# Patient Record
Sex: MALE | Race: WHITE | Employment: FULL TIME | URBAN - METROPOLITAN AREA
[De-identification: names, ages, dates, MRNs, and addresses within clinical notes are randomized per-mention and may not be internally consistent; named-entity substitution may affect disease eponyms.]

---

## 2017-01-11 RX ORDER — METFORMIN HYDROCHLORIDE 500 MG/1
TABLET ORAL
Qty: 120 TAB | Refills: 4 | OUTPATIENT
Start: 2017-01-11

## 2017-01-12 RX ORDER — METFORMIN HYDROCHLORIDE 500 MG/1
1000 TABLET ORAL 2 TIMES DAILY WITH MEALS
Qty: 120 TAB | Refills: 4 | Status: SHIPPED | OUTPATIENT
Start: 2017-01-12 | End: 2017-07-30 | Stop reason: SDUPTHER

## 2017-05-23 ENCOUNTER — APPOINTMENT (OUTPATIENT)
Dept: GENERAL RADIOLOGY | Age: 57
End: 2017-05-23
Attending: FAMILY MEDICINE

## 2017-05-23 ENCOUNTER — HOSPITAL ENCOUNTER (EMERGENCY)
Age: 57
Discharge: HOME | End: 2017-05-23
Attending: FAMILY MEDICINE

## 2017-05-23 VITALS
BODY MASS INDEX: 33.86 KG/M2 | TEMPERATURE: 97.6 F | HEART RATE: 80 BPM | SYSTOLIC BLOOD PRESSURE: 159 MMHG | HEIGHT: 72 IN | WEIGHT: 250 LBS | DIASTOLIC BLOOD PRESSURE: 79 MMHG | RESPIRATION RATE: 16 BRPM | OXYGEN SATURATION: 99 %

## 2017-05-23 DIAGNOSIS — M19.072 ARTHRITIS OF LEFT FOOT: Primary | ICD-10-CM

## 2017-05-23 RX ORDER — TRAMADOL HYDROCHLORIDE 50 MG/1
50 TABLET ORAL
Qty: 20 TAB | Refills: 0 | Status: SHIPPED | OUTPATIENT
Start: 2017-05-23 | End: 2019-02-05

## 2017-05-23 NOTE — UC PROVIDER NOTE
Patient is a 61 y.o. male presenting with foot pain. The history is provided by the patient. No  was used. Foot Pain    This is a new problem. Episode onset: 1 month. Episode frequency: Intermittent. The problem has not changed since onset. The pain is present in the left foot (Left lateral foot). The quality of the pain is described as aching. The pain is at a severity of 2/10. The pain is mild. Pertinent negatives include no numbness, full range of motion and no tingling. The symptoms are aggravated by movement and palpation. He has tried OTC pain medications for the symptoms. The treatment provided no relief. There has been no history of extremity trauma. Denies hx of Gout        Past Medical History:   Diagnosis Date    Diabetes ([address]     Dyspepsia and other specified disorders of function of stomach     GERD (gastroesophageal reflux disease)     Hemorrhoids     Psoriasis     Psoriasis     Umbilical hernia     Umbilical hernia         Past Surgical History:   Procedure Laterality Date    HX APPENDECTOMY      HX HERNIA REPAIR  6-9-15    repair [facility] umbilical [facility]nia with underlay mesh-[facility]Dr. Quita Carlos    HX TONSILLECTOMY           Family History   Problem Relation Age of Onset    Heart Attack Father       63's    Diabetes Father         Social History     Social History    Marital status:      Spouse name: N/A    Number of children: N/A    Years of education: N/A     Occupational History    Not on file.      Social History Main Topics    Smoking status: Former Smoker     Packs/day: 2.00     Years: 21.00     Types: Cigarettes     Quit date: 1998    Smokeless tobacco: Never Used    Alcohol use 1.0 oz/week     2 Cans of beer per week      Comment: sometimes    Drug use: No    Sexual activity: Not on file     Other Topics Concern    Not on file     Social History Narrative                ALLERGIES: Aspirin and Motrin [ibuprofen]    Review of Systems Constitutional: Negative. HENT: Negative. Eyes: Negative. Respiratory: Negative. Cardiovascular: Negative. Gastrointestinal: Negative. Endocrine: Negative. Genitourinary: Negative. Musculoskeletal:        Left lateral foot pain. Allergic/Immunologic: Negative. Neurological: Negative. Negative for tingling and numbness. Hematological: Negative. Vitals:    05/23/17 1709   BP: 159/79   Pulse: 80   Resp: 16   Temp: 97.6 °F (36.4 °C)   SpO2: 99%   Weight: 113.4 kg (250 lb)   Height: 6' (1.829 m)       Physical Exam   Constitutional: He is oriented to person, place, and time. He appears well-developed and well-nourished. HENT:   Head: Normocephalic and atraumatic. Right Ear: External ear normal.   Left Ear: External ear normal.   Nose: Nose normal.   Mouth/Throat: Oropharynx is clear and moist.   Eyes: Conjunctivae and EOM are normal. Pupils are equal, round, and reactive to light. Neck: Normal range of motion. Cardiovascular: Normal rate, regular rhythm, normal heart sounds and intact distal pulses. Pulmonary/Chest: Effort normal and breath sounds normal.   Musculoskeletal: Normal range of motion. He exhibits tenderness. He exhibits no deformity. Left lateral foot tenderness with palpation  2+ DP/PT pulses  No erythema or deformity  Nails intact   Neurological: He is alert and oriented to person, place, and time. Skin: Skin is warm and dry. Psychiatric: He has a normal mood and affect. His behavior is normal. Thought content normal.   Nursing note and vitals reviewed. MDM     Differential Diagnosis; Clinical Impression; Plan:     CLINICAL IMPRESSION:  Arthritis of left foot  (primary encounter diagnosis)    Plan:  1. Arthritis   2. Wear good fitting shoes  3.  Follow up with podiatrist  Amount and/or Complexity of Data Reviewed:   Tests in the radiology section of CPT®:  Ordered and reviewed  Risk of Significant Complications, Morbidity, and/or Mortality: Presenting problems: Moderate  Diagnostic procedures:   Moderate  Progress:   Patient progress:  Stable      Procedures

## 2017-05-23 NOTE — DISCHARGE INSTRUCTIONS
Osteoarthritis: Care Instructions  Your Care Instructions    Arthritis is a common health problem in which the joints are inflamed. There are several kinds of arthritis. Osteoarthritis is caused by a breakdown of cartilage, the hard, thick tissue that cushions the joints. It causes pain, stiffness, and swelling, often in the spine, fingers, hips, and knees. Osteoarthritis can happen at any age, but it is most common in older people. Osteoarthritis never goes away completely, but it can be controlled. Medicine and home treatment can reduce the pain and prevent the arthritis from getting worse. Follow-up care is a key part of your treatment and safety. Be sure to make and go to all appointments, and call your doctor if you are having problems. It's also a good idea to know your test results and keep a list of the medicines you take. How can you care for yourself at home? · Take a warm shower or bath in the morning to relieve stiffness. Avoid sitting still afterwards. · If the joint is not swollen, use moist heat, like a warm, damp towel, for 20 to 30 minutes, 2 or 3 times a day. Do not use heat on a swollen joint. · If the joint is swollen, use ice or cold packs for 10 to 20 minutes, once an hour. Cold will help relieve pain and reduce inflammation. Put a thin cloth between the ice and your skin. · To prevent stiffness, gently move the joint through its full range of motion several times a day. · If the joint hurts, avoid activities that put a strain on it for a few days. Take rest breaks throughout the day. · Get regular exercise. Walking, swimming, yoga, biking, re chi, and water aerobics are good exercises that are gentle on the joints. · Reach and stay at a healthy weight. If you need to lose or maintain weight, regular exercise and a healthy diet will help. Extra weight can strain the joints, especially the knees and hips, and make the pain worse. Losing even a few pounds may help.   · Take pain medicines exactly as directed. ¨ If the doctor gave you a prescription medicine for pain, take it as prescribed. ¨ If you are not taking a prescription pain medicine, ask your doctor if you can take an over-the-counter medicine. When should you call for help? Call your doctor now or seek immediate medical care if:  · The pain is so bad that you cannot use the joint. · You have sudden back pain with weakness in your legs or loss of bowel or bladder control. · Your stools are black and tarlike or have streaks of blood. · You have severe pain and swelling in more than one joint. Watch closely for changes in your health, and be sure to contact your doctor if:  · You have side effects from the medicines, like belly pain, ongoing heartburn, or nausea. · Joint pain continues for more than 6 weeks, and home treatment is not helping. Where can you learn more? Go to http://selwyn-gerardo.info/. Enter M825 in the search box to learn more about \"Osteoarthritis: Care Instructions. \"  Current as of: November 28, 2016  Content Version: 11.2  © 4285-9025 [address] Care instructions adapted under license by [employer] (which disclaims liability or warranty for this information). If you have questions about a medical condition or this instruction, always ask your healthcare professional. [address] any warranty or liability for your use of this information.

## 2017-07-30 RX ORDER — METFORMIN HYDROCHLORIDE 500 MG/1
TABLET ORAL
Qty: 120 TAB | Refills: 4 | Status: SHIPPED | OUTPATIENT
Start: 2017-07-30 | End: 2017-12-31 | Stop reason: SDUPTHER

## 2017-08-28 RX ORDER — ESCITALOPRAM OXALATE 20 MG/1
TABLET ORAL
Qty: 90 TAB | Refills: 1 | Status: SHIPPED | OUTPATIENT
Start: 2017-08-28 | End: 2019-03-11 | Stop reason: SDUPTHER

## 2017-12-31 RX ORDER — METFORMIN HYDROCHLORIDE 500 MG/1
TABLET ORAL
Qty: 120 TAB | Refills: 4 | Status: SHIPPED | OUTPATIENT
Start: 2017-12-31 | End: 2018-09-01 | Stop reason: SDUPTHER

## 2017-12-31 RX ORDER — PRAVASTATIN SODIUM 20 MG/1
TABLET ORAL
Qty: 90 TAB | Refills: 3 | Status: SHIPPED | OUTPATIENT
Start: 2017-12-31 | End: 2019-03-11 | Stop reason: SDUPTHER

## 2018-03-12 RX ORDER — BLOOD SUGAR DIAGNOSTIC
STRIP MISCELLANEOUS
Qty: 50 STRIP | Refills: 11 | Status: SHIPPED | OUTPATIENT
Start: 2018-03-12 | End: 2019-03-11 | Stop reason: SDUPTHER

## 2018-03-22 RX ORDER — SITAGLIPTIN 100 MG/1
TABLET, FILM COATED ORAL
Qty: 30 TAB | Refills: 6 | Status: SHIPPED | OUTPATIENT
Start: 2018-03-22 | End: 2018-11-13 | Stop reason: SDUPTHER

## 2018-09-01 RX ORDER — METFORMIN HYDROCHLORIDE 500 MG/1
TABLET ORAL
Qty: 120 TAB | Refills: 4 | Status: SHIPPED | OUTPATIENT
Start: 2018-09-01 | End: 2019-03-11 | Stop reason: SDUPTHER

## 2018-11-13 RX ORDER — SITAGLIPTIN 100 MG/1
TABLET, FILM COATED ORAL
Qty: 30 TAB | Refills: 6 | Status: SHIPPED | OUTPATIENT
Start: 2018-11-13 | End: 2019-03-11 | Stop reason: SDUPTHER

## 2019-02-05 ENCOUNTER — OFFICE VISIT (OUTPATIENT)
Dept: URGENT CARE | Age: 59
End: 2019-02-05

## 2019-02-05 VITALS
HEART RATE: 74 BPM | TEMPERATURE: 97.6 F | HEIGHT: 72 IN | DIASTOLIC BLOOD PRESSURE: 72 MMHG | SYSTOLIC BLOOD PRESSURE: 141 MMHG | BODY MASS INDEX: 33.46 KG/M2 | WEIGHT: 247 LBS | RESPIRATION RATE: 18 BRPM | OXYGEN SATURATION: 95 %

## 2019-02-05 DIAGNOSIS — J40 BRONCHITIS: Primary | ICD-10-CM

## 2019-02-05 DIAGNOSIS — R05.9 COUGH: ICD-10-CM

## 2019-02-05 RX ORDER — IPRATROPIUM BROMIDE AND ALBUTEROL SULFATE 2.5; .5 MG/3ML; MG/3ML
3 SOLUTION RESPIRATORY (INHALATION)
Status: COMPLETED | OUTPATIENT
Start: 2019-02-05 | End: 2019-02-05

## 2019-02-05 RX ORDER — PREDNISONE 10 MG/1
TABLET ORAL
Qty: 21 TAB | Refills: 0 | Status: SHIPPED | OUTPATIENT
Start: 2019-02-05 | End: 2019-03-11

## 2019-02-05 RX ORDER — ALBUTEROL SULFATE 90 UG/1
2 AEROSOL, METERED RESPIRATORY (INHALATION)
Qty: 1 INHALER | Refills: 0 | Status: SHIPPED | OUTPATIENT
Start: 2019-02-05 | End: 2021-09-03 | Stop reason: SDUPTHER

## 2019-02-05 RX ORDER — BUDESONIDE AND FORMOTEROL FUMARATE DIHYDRATE 80; 4.5 UG/1; UG/1
2 AEROSOL RESPIRATORY (INHALATION) 2 TIMES DAILY
COMMUNITY
End: 2019-03-11 | Stop reason: SDUPTHER

## 2019-02-05 RX ORDER — DOXYCYCLINE 100 MG/1
100 CAPSULE ORAL 2 TIMES DAILY
Qty: 20 CAP | Refills: 0 | Status: SHIPPED | OUTPATIENT
Start: 2019-02-05 | End: 2019-02-15

## 2019-02-05 RX ADMIN — IPRATROPIUM BROMIDE AND ALBUTEROL SULFATE 3 ML: 2.5; .5 SOLUTION RESPIRATORY (INHALATION) at 10:45

## 2019-02-05 NOTE — PROGRESS NOTES
Cold Symptoms   The history is provided by the patient. This is a new problem. Episode onset: 1 week ago. The problem occurs constantly. The problem has not changed since onset. The cough is productive of sputum. There has been no fever. Associated symptoms include rhinorrhea and shortness of breath. Pertinent negatives include no chest pain, no chills, no sweats, no ear congestion, no ear pain, no sore throat, no myalgias and no wheezing. Smoker: Former. His past medical history is significant for COPD. Past Medical History:   Diagnosis Date    Diabetes ([address]     Dyspepsia and other specified disorders of function of stomach     GERD (gastroesophageal reflux disease)     Hemorrhoids     Psoriasis     Psoriasis     Umbilical hernia     Umbilical hernia 1977        Past Surgical History:   Procedure Laterality Date    HX APPENDECTOMY      HX HERNIA REPAIR  6-9-15    repair of umbilical henia with underlay mesh-[facility]-Dr. Opal Sanchez    HX TONSILLECTOMY           Family History   Problem Relation Age of Onset    Heart Attack Father          63's    Diabetes Father         Social History     Socioeconomic History    Marital status:      Spouse name: Not on file    Number of children: Not on file    Years of education: Not on file    Highest education level: Not on file   Social Needs    Financial resource strain: Not on file    Food insecurity - worry: Not on file    Food insecurity - inability: Not on file   [employer] needs - medical: Not on file   [employer] needs - non-medical: Not on file   Occupational History    Not on file   Tobacco Use    Smoking status: Former Smoker     Packs/day: 2.00     Years: 21.00     Pack years: 42.00     Types: Cigarettes     Last attempt to quit: 1998     Years since quittin.1    Smokeless tobacco: Never Used   Substance and Sexual Activity    Alcohol use:  Yes     Alcohol/week: 1.0 oz     Types: 2 Cans of beer per week     Comment: sometimes    Drug use: No    Sexual activity: Not on file   Other Topics Concern    Not on file   Social History Narrative    Not on file                ALLERGIES: Aspirin and Motrin [ibuprofen]    Review of Systems   Constitutional: Negative for chills and fever. HENT: Positive for congestion and rhinorrhea. Negative for ear pain, sinus pressure, sinus pain and sore throat. Respiratory: Positive for cough and shortness of breath. Negative for wheezing. Cardiovascular: Negative for chest pain and palpitations. Musculoskeletal: Negative for myalgias. Skin: Negative for rash. Hematological: Negative for adenopathy. Vitals:    02/05/19 1026   BP: 141/72   Pulse: 74   Resp: 18   Temp: 97.6 °F (36.4 °C)   SpO2: 95%   Weight: 247 lb (112 kg)   Height: 6' (1.829 m)       Physical Exam   Constitutional: He appears well-developed and well-nourished. No distress. HENT:   Right Ear: Tympanic membrane, external ear and ear canal normal.   Left Ear: Tympanic membrane, external ear and ear canal normal.   Nose: Nose normal. Right sinus exhibits no maxillary sinus tenderness and no frontal sinus tenderness. Left sinus exhibits no maxillary sinus tenderness and no frontal sinus tenderness. Mouth/Throat: Oropharynx is clear and moist and mucous membranes are normal. No oropharyngeal exudate, posterior oropharyngeal edema, posterior oropharyngeal erythema or tonsillar abscesses. Cardiovascular: Normal rate, regular rhythm and normal heart sounds. Pulmonary/Chest: Effort normal. No respiratory distress. He has decreased breath sounds in the right lower field. He has no wheezes. He has no rhonchi. He has no rales. Lymphadenopathy:     He has no cervical adenopathy. Neurological: He is alert. Skin: He is not diaphoretic. Psychiatric: He has a normal mood and affect. His behavior is normal. Judgment and thought content normal.   Nursing note and vitals reviewed.       MDM ICD-10-CM ICD-9-CM    1. Bronchitis J40 490    2. Cough R05 786.2 XR CHEST PA LAT     Medications Ordered Today   Medications    albuterol-ipratropium (DUO-NEB) 2.5 MG-0.5 MG/3 ML     Order Specific Question:   MODE OF DELIVERY     Answer:   Nebulizer    predniSONE (STERAPRED DS) 10 mg dose pack     Sig: Take as directed for 6 days, with food     Dispense:  21 Tab     Refill:  0    albuterol (PROVENTIL HFA, VENTOLIN HFA, PROAIR HFA) 90 mcg/actuation inhaler     Sig: Take 2 Puffs by inhalation every four (4) hours as needed for Wheezing. Dispense:  1 Inhaler     Refill:  0    doxycycline (VIBRAMYCIN) 100 mg capsule     Sig: Take 1 Cap by mouth two (2) times a day for 10 days. Dispense:  20 Cap     Refill:  0     The patients condition was discussed with the patient and they understand. The patient is to follow up with PCP. If signs and symptoms become worse the pt is to go to the ER. The patient is to take medications as prescribed. XR Results (most recent):  Results from Appointment encounter on 02/05/19   XR CHEST PA LAT    Narrative Exam:  2 view chest    Indication: Cough and shortness of breath. COMPARISON: 4/20/2016    PA and lateral views demonstrate normal heart size. The lungs are hyperinflated. There is a paucity of normal size and branching  vessels in the upper lung zones suggesting emphysema. Diaphragms are mildly  flattened. No pneumonia. No adenopathy or pleural effusions. Impression IMPRESSION:  1. No pneumonia  2.  Findings suggest emphysema           Procedures

## 2019-02-05 NOTE — PATIENT INSTRUCTIONS
Bronchitis: Care Instructions  Your Care Instructions    Bronchitis is inflammation of the bronchial tubes, which carry air to the lungs. The tubes swell and produce mucus, or phlegm. The mucus and inflamed bronchial tubes make you cough. You may have trouble breathing. Most cases of bronchitis are caused by viruses like those that cause colds. Antibiotics usually do not help and they may be harmful. Bronchitis usually develops rapidly and lasts about 2 to 3 weeks in otherwise healthy people. Follow-up care is a key part of your treatment and safety. Be sure to make and go to all appointments, and call your doctor if you are having problems. It's also a good idea to know your test results and keep a list of the medicines you take. How can you care for yourself at home? · Take all medicines exactly as prescribed. Call your doctor if you think you are having a problem with your medicine. · Get some extra rest.  · Take an over-the-counter pain medicine, such as acetaminophen (Tylenol), ibuprofen (Advil, Motrin), or naproxen (Aleve) to reduce fever and relieve body aches. Read and follow all instructions on the label. · Do not take two or more pain medicines at the same time unless the doctor told you to. Many pain medicines have acetaminophen, which is Tylenol. Too much acetaminophen (Tylenol) can be harmful. · Take an over-the-counter cough medicine that contains dextromethorphan to help quiet a dry, hacking cough so that you can sleep. Avoid cough medicines that have more than one active ingredient. Read and follow all instructions on the label. · Breathe moist air from a humidifier, hot shower, or sink filled with hot water. The heat and moisture will thin mucus so you can cough it out. · Do not smoke. Smoking can make bronchitis worse. If you need help quitting, talk to your doctor about stop-smoking programs and medicines. These can increase your chances of quitting for good.   When should you call for help? Call 911 anytime you think you may need emergency care. For example, call if:    · You have severe trouble breathing.    Call your doctor now or seek immediate medical care if:    · You have new or worse trouble breathing.     · You cough up dark brown or bloody mucus (sputum).     · You have a new or higher fever.     · You have a new rash.    Watch closely for changes in your health, and be sure to contact your doctor if:    · You cough more deeply or more often, especially if you notice more mucus or a change in the color of your mucus.     · You are not getting better as expected. Where can you learn more? Go to http://selwyn-geradro.info/. Enter H333 in the search box to learn more about \"Bronchitis: Care Instructions. \"  Current as of: September 5, 2018  Content Version: 11.9  © 7105-0373 [employer]. Care instructions adapted under license by [employer] (which disclaims liability or warranty for this information). If you have questions about a medical condition or this instruction, always ask your healthcare professional. [address] any warranty or liability for your use of this information.

## 2019-02-08 RX ORDER — ESCITALOPRAM OXALATE 20 MG/1
TABLET ORAL
Qty: 30 TAB | Refills: 0 | OUTPATIENT
Start: 2019-02-08

## 2019-02-08 RX ORDER — PRAVASTATIN SODIUM 20 MG/1
TABLET ORAL
Qty: 90 TAB | Refills: 3 | OUTPATIENT
Start: 2019-02-08

## 2019-03-10 NOTE — PROGRESS NOTES
HISTORY OF PRESENT ILLNESS  Sergio Rivero is a 60 y.o. male. HPI     Patient here today routine f/u diabetes hypertension hyperlipidemia obesity  Last VO 3 years ago  Due for pneumovax 23  Last a1c 7.1 ldl 144   fsbs around 110 in mornings  Some tinglign in feet towards end of the day  Also weak urine stream x 6 months with nocturia x 2 on average        Last OV  He reports as was reestablished about 150-160 the mornings but usually slightly lower around one 3140 later in the daytime  Reports compliance with metformin which she increased to 2 tablets twice daily  Reports that he had an eye exam 8 months ago with a subconscious Dr. Inga Hester- no retinopathy per patient  Symptoms of neuropathy in his feet  Has not had any chest pain symptoms but has mild dyspnea on exertion, but a negative stress test about 2 years ago the symptoms  Was in the emergency room last month with a kidney stone which passed spontaneously    Patient Active Problem List    Diagnosis Date Noted    Umbilical hernia 70/73/6957    Diabetes mellitus ([facility] [address] 01/13/2012    Dyslipidemia 06/21/2011    Psoriasis 06/21/2011    Depression 06/21/2011     Current Outpatient Medications   Medication Sig Dispense Refill    budesonide-formoterol (SYMBICORT) 80-4.5 mcg/actuation HFAA Take 2 Puffs by inhalation two (2) times a day.  predniSONE (STERAPRED DS) 10 mg dose pack Take as directed for 6 days, with food 21 Tab 0    albuterol (PROVENTIL HFA, VENTOLIN HFA, PROAIR HFA) 90 mcg/actuation inhaler Take 2 Puffs by inhalation every four (4) hours as needed for Wheezing.  1 Inhaler 0    JANUVIA 100 mg tablet take 1 tablet by mouth once daily 30 Tab 6    metFORMIN (GLUCOPHAGE) 500 mg tablet take 2 tablets by mouth twice a day with food 120 Tab 4    ACCU-CHEK DORIS PLUS TEST STRP strip TEST twice a day as directed 50 Strip 11    pravastatin (PRAVACHOL) 20 mg tablet take 1 tablet by mouth once daily 90 Tab 3    escitalopram oxalate (LEXAPRO) 20 mg tablet take 1 tablet by mouth once daily 90 Tab 1    ALPRAZolam (XANAX) 0.25 mg tablet take 1 tablet by mouth NIGHTLY if needed for anxiety 30 Tab 1    Cetirizine (ZYRTEC) 10 mg cap Take  by mouth as needed.  omeprazole (PRILOSEC) 20 mg capsule Take 1 Cap by mouth daily. (Patient taking differently: Take 20 mg by mouth daily. TAKES IN AM) 20 Cap 0     Allergies   Allergen Reactions    Aspirin Other (comments)     PUD    Motrin [Ibuprofen] Rash     Social History     Tobacco Use    Smoking status: Former Smoker     Packs/day: 2.00     Years: 21.00     Pack years: 42.00     Types: Cigarettes     Last attempt to quit: 1998     Years since quittin.2    Smokeless tobacco: Never Used   Substance Use Topics    Alcohol use:  Yes     Alcohol/week: 1.0 oz     Types: 2 Cans of beer per week     Comment: sometimes      Lab Results   Component Value Date/Time    WBC 10.1 2016 09:17 AM    HGB 13.5 2016 09:17 AM    HCT 41.7 2016 09:17 AM    PLATELET 761  09:17 AM    MCV 83.9 2016 09:17 AM     Lab Results   Component Value Date/Time    Hemoglobin A1c 7.2 (H) 10/17/2014 09:47 AM    Hemoglobin A1c 6.8 (H) 2013 08:32 AM    Hemoglobin A1c 7.2 (H) 2012 07:43 AM    Glucose 246 (H) 2016 09:17 AM    Glucose (POC) 135 (H) 2015 04:56 PM    Microalb/Creat ratio (ug/mg creat.) 11.1 10/17/2014 09:47 AM    LDL, calculated 144 (H) 2013 08:32 AM    Creatinine 1.28 2016 09:17 AM      Lab Results   Component Value Date/Time    Cholesterol, total 223 (H) 2013 08:32 AM    HDL Cholesterol 53 2013 08:32 AM    LDL, calculated 144 (H) 2013 08:32 AM    Triglyceride 128 2013 08:32 AM     Lab Results   Component Value Date/Time    GFR est non-AA 58 (L) 2016 09:17 AM    GFR est AA >60 2016 09:17 AM    Creatinine 1.28 2016 09:17 AM    BUN 17 2016 09:17 AM    Sodium 137 2016 09:17 AM    Potassium 4.4 2016 09:17 AM Chloride 104 02/17/2016 09:17 AM    CO2 25 02/17/2016 09:17 AM     Lab Results   Component Value Date/Time    Prostate Specific Ag 0.9 10/17/2014 09:47 AM    Prostate Specific Ag 0.9 07/25/2013 08:32 AM    Prostate Specific Ag 1.2 01/06/2012 07:43 AM        ROS    Physical Exam   Constitutional: He appears well-developed and well-nourished. No distress. Appears stated age, obese, nad   HENT:   Head: Normocephalic. Cardiovascular: Normal rate, regular rhythm and normal heart sounds. Exam reveals no gallop and no friction rub. No murmur heard. Pulmonary/Chest: Effort normal and breath sounds normal. He has no wheezes. He has no rales. He exhibits no tenderness. Abdominal: Soft. Musculoskeletal: He exhibits no edema. Neurological: He is alert. Diabetic foot exam performed by Abril Edgar MD       Measurement  Response Nurse Comment Physician Comment  Monofilament  R - normal sensation with micro filament  L - normal sensation with micro filament    Pulse DP R - 2+ (normal)  L - 2+ (normal)    Pulse TP R - 2+ (normal)  L - 2+ (normal)    Structural deformity R - Mild - right lateral foot callous  L - None    Skin Integrity / Deformity R - None  L - None       Reviewed by:         Skin:   Callous right lateral foot   Psychiatric: He has a normal mood and affect. Nursing note and vitals reviewed. ASSESSMENT and PLAN  Diagnoses and all orders for this visit:    1. Controlled type 2 diabetes mellitus without complication, without long-term current use of insulin (HCC)  -     METABOLIC PANEL, COMPREHENSIVE  -     HEMOGLOBIN A1C WITH EAG  -     MICROALBUMIN, UR, RAND W/ MICROALB/CREAT RATIO  -     REFERRAL TO PODIATRY   UTD eye exam per pt     Advised aspirin 8 1mg every day   Controlled per home readings   Refilled medicines  2. Essential hypertension  -     CBC W/O DIFF  -     METABOLIC PANEL, COMPREHENSIVE   Controlled without mediciation    3.  Pure hypercholesterolemia  -     METABOLIC PANEL, COMPREHENSIVE  -     LIPID PANEL    4. Prostate cancer screening  -     PSA W/ REFLX FREE PSA    5. Encounter for hepatitis C screening test for low risk patient  -     HEPATITIS C AB    6. Colon cancer screening  -     REFERRAL TO GASTROENTEROLOGY    7. Neuropathy  -     REFERRAL TO PODIATRY  -     REFERRAL TO PODIATRY   Declines lyrica or gabapentin    8. Pre-ulcerative corn or callous  -     REFERRAL TO PODIATRY  -     REFERRAL TO PODIATRY    9. Benign prostatic hyperplasia with weak urinary stream  -     tamsulosin (FLOMAX) 0.4 mg capsule; Take 1 Cap by mouth daily. 10. MDD   Controlled on lexapro per pt --refilled  Other orders  -     metFORMIN (GLUCOPHAGE) 500 mg tablet; take 2 tablets by mouth twice a day with food  -     pravastatin (PRAVACHOL) 20 mg tablet; take 1 tablet by mouth once daily  -     escitalopram oxalate (LEXAPRO) 20 mg tablet; take 1 tablet by mouth once daily  -     SITagliptin (JANUVIA) 100 mg tablet; take 1 tablet by mouth once daily  -     budesonide-formoterol (SYMBICORT) 80-4.5 mcg/actuation HFAA; Take 2 Puffs by inhalation two (2) times a day. -     glucose blood VI test strips (ACCU-CHEK DORIS PLUS TEST STRP) strip; TEST twice a day as directed      Follow-up Disposition:  Return in about 6 months (around 9/11/2019) for dm-2 hld weight mdd.

## 2019-03-11 ENCOUNTER — OFFICE VISIT (OUTPATIENT)
Dept: INTERNAL MEDICINE CLINIC | Age: 59
End: 2019-03-11

## 2019-03-11 VITALS
BODY MASS INDEX: 33.05 KG/M2 | WEIGHT: 244 LBS | TEMPERATURE: 97.4 F | HEART RATE: 76 BPM | DIASTOLIC BLOOD PRESSURE: 69 MMHG | SYSTOLIC BLOOD PRESSURE: 129 MMHG | HEIGHT: 72 IN | OXYGEN SATURATION: 97 %

## 2019-03-11 DIAGNOSIS — L84 PRE-ULCERATIVE CORN OR CALLOUS: ICD-10-CM

## 2019-03-11 DIAGNOSIS — Z12.11 COLON CANCER SCREENING: ICD-10-CM

## 2019-03-11 DIAGNOSIS — G62.9 NEUROPATHY: ICD-10-CM

## 2019-03-11 DIAGNOSIS — Z11.59 ENCOUNTER FOR HEPATITIS C SCREENING TEST FOR LOW RISK PATIENT: ICD-10-CM

## 2019-03-11 DIAGNOSIS — E11.9 CONTROLLED TYPE 2 DIABETES MELLITUS WITHOUT COMPLICATION, WITHOUT LONG-TERM CURRENT USE OF INSULIN (HCC): Primary | ICD-10-CM

## 2019-03-11 DIAGNOSIS — R39.12 BENIGN PROSTATIC HYPERPLASIA WITH WEAK URINARY STREAM: ICD-10-CM

## 2019-03-11 DIAGNOSIS — E78.00 PURE HYPERCHOLESTEROLEMIA: ICD-10-CM

## 2019-03-11 DIAGNOSIS — I10 ESSENTIAL HYPERTENSION: ICD-10-CM

## 2019-03-11 DIAGNOSIS — Z12.5 PROSTATE CANCER SCREENING: ICD-10-CM

## 2019-03-11 DIAGNOSIS — N40.1 BENIGN PROSTATIC HYPERPLASIA WITH WEAK URINARY STREAM: ICD-10-CM

## 2019-03-11 RX ORDER — BUDESONIDE AND FORMOTEROL FUMARATE DIHYDRATE 80; 4.5 UG/1; UG/1
2 AEROSOL RESPIRATORY (INHALATION) 2 TIMES DAILY
Qty: 3 INHALER | Refills: 3 | Status: SHIPPED | OUTPATIENT
Start: 2019-03-11 | End: 2021-09-03 | Stop reason: SDUPTHER

## 2019-03-11 RX ORDER — METFORMIN HYDROCHLORIDE 500 MG/1
TABLET ORAL
Qty: 360 TAB | Refills: 3 | Status: SHIPPED | OUTPATIENT
Start: 2019-03-11 | End: 2020-03-14 | Stop reason: SDUPTHER

## 2019-03-11 RX ORDER — ESCITALOPRAM OXALATE 20 MG/1
TABLET ORAL
Qty: 90 TAB | Refills: 3 | Status: SHIPPED | OUTPATIENT
Start: 2019-03-11 | End: 2020-03-20

## 2019-03-11 RX ORDER — TAMSULOSIN HYDROCHLORIDE 0.4 MG/1
0.4 CAPSULE ORAL DAILY
Qty: 30 CAP | Refills: 6 | Status: SHIPPED | OUTPATIENT
Start: 2019-03-11 | End: 2019-11-22 | Stop reason: SDUPTHER

## 2019-03-11 RX ORDER — PRAVASTATIN SODIUM 20 MG/1
TABLET ORAL
Qty: 90 TAB | Refills: 3 | Status: SHIPPED | OUTPATIENT
Start: 2019-03-11 | End: 2020-03-14 | Stop reason: SDUPTHER

## 2019-03-11 NOTE — PROGRESS NOTES
Chief Complaint   Patient presents with    Diabetes    Medication Refill     all medication    Unable To Void     narrow stream when voiding    Labs     Non fasting    Diabetic Foot Exam     numbness and pain

## 2019-03-14 LAB
ALBUMIN SERPL-MCNC: 4.1 G/DL (ref 3.5–5.5)
ALBUMIN/CREAT UR: 4.7 MG/G CREAT (ref 0–30)
ALBUMIN/GLOB SERPL: 1.5 {RATIO} (ref 1.2–2.2)
ALP SERPL-CCNC: 54 IU/L (ref 39–117)
ALT SERPL-CCNC: 27 IU/L (ref 0–44)
AST SERPL-CCNC: 15 IU/L (ref 0–40)
BILIRUB SERPL-MCNC: 0.2 MG/DL (ref 0–1.2)
BUN SERPL-MCNC: 12 MG/DL (ref 6–24)
BUN/CREAT SERPL: 14 (ref 9–20)
CALCIUM SERPL-MCNC: 9.3 MG/DL (ref 8.7–10.2)
CHLORIDE SERPL-SCNC: 103 MMOL/L (ref 96–106)
CHOLEST SERPL-MCNC: 165 MG/DL (ref 100–199)
CO2 SERPL-SCNC: 25 MMOL/L (ref 20–29)
CREAT SERPL-MCNC: 0.85 MG/DL (ref 0.76–1.27)
CREAT UR-MCNC: 146.9 MG/DL
ERYTHROCYTE [DISTWIDTH] IN BLOOD BY AUTOMATED COUNT: 14.8 % (ref 12.3–15.4)
EST. AVERAGE GLUCOSE BLD GHB EST-MCNC: 134 MG/DL
GLOBULIN SER CALC-MCNC: 2.7 G/DL (ref 1.5–4.5)
GLUCOSE SERPL-MCNC: 87 MG/DL (ref 65–99)
HBA1C MFR BLD: 6.3 % (ref 4.8–5.6)
HCT VFR BLD AUTO: 40.3 % (ref 37.5–51)
HCV AB S/CO SERPL IA: <0.1 S/CO RATIO (ref 0–0.9)
HDLC SERPL-MCNC: 44 MG/DL
HGB BLD-MCNC: 13.1 G/DL (ref 13–17.7)
LDLC SERPL CALC-MCNC: 85 MG/DL (ref 0–99)
MCH RBC QN AUTO: 27.3 PG (ref 26.6–33)
MCHC RBC AUTO-ENTMCNC: 32.5 G/DL (ref 31.5–35.7)
MCV RBC AUTO: 84 FL (ref 79–97)
MICROALBUMIN UR-MCNC: 6.9 UG/ML
PLATELET # BLD AUTO: 219 X10E3/UL (ref 150–379)
POTASSIUM SERPL-SCNC: 4.6 MMOL/L (ref 3.5–5.2)
PROT SERPL-MCNC: 6.8 G/DL (ref 6–8.5)
PSA SERPL-MCNC: 0.8 NG/ML (ref 0–4)
RBC # BLD AUTO: 4.8 X10E6/UL (ref 4.14–5.8)
REFLEX CRITERIA: NORMAL
SODIUM SERPL-SCNC: 143 MMOL/L (ref 134–144)
TRIGL SERPL-MCNC: 178 MG/DL (ref 0–149)
VLDLC SERPL CALC-MCNC: 36 MG/DL (ref 5–40)
WBC # BLD AUTO: 6.6 X10E3/UL (ref 3.4–10.8)

## 2019-09-12 ENCOUNTER — OFFICE VISIT (OUTPATIENT)
Dept: INTERNAL MEDICINE CLINIC | Age: 59
End: 2019-09-12

## 2019-09-12 VITALS
HEART RATE: 93 BPM | WEIGHT: 247 LBS | DIASTOLIC BLOOD PRESSURE: 68 MMHG | OXYGEN SATURATION: 96 % | SYSTOLIC BLOOD PRESSURE: 130 MMHG | BODY MASS INDEX: 33.46 KG/M2 | RESPIRATION RATE: 16 BRPM | TEMPERATURE: 97.5 F | HEIGHT: 72 IN

## 2019-09-12 DIAGNOSIS — J43.9 PULMONARY EMPHYSEMA, UNSPECIFIED EMPHYSEMA TYPE (HCC): ICD-10-CM

## 2019-09-12 DIAGNOSIS — M79.671 RIGHT FOOT PAIN: ICD-10-CM

## 2019-09-12 DIAGNOSIS — I10 ESSENTIAL HYPERTENSION: ICD-10-CM

## 2019-09-12 DIAGNOSIS — Z12.11 COLON CANCER SCREENING: ICD-10-CM

## 2019-09-12 DIAGNOSIS — Z23 ENCOUNTER FOR IMMUNIZATION: ICD-10-CM

## 2019-09-12 DIAGNOSIS — E78.00 PURE HYPERCHOLESTEROLEMIA: ICD-10-CM

## 2019-09-12 DIAGNOSIS — E11.9 CONTROLLED TYPE 2 DIABETES MELLITUS WITHOUT COMPLICATION, WITHOUT LONG-TERM CURRENT USE OF INSULIN (HCC): Primary | ICD-10-CM

## 2019-09-12 RX ORDER — DICLOFENAC SODIUM 50 MG/1
50 TABLET, DELAYED RELEASE ORAL 2 TIMES DAILY
Qty: 30 TAB | Refills: 0 | Status: SHIPPED | OUTPATIENT
Start: 2019-09-12

## 2019-09-12 NOTE — PROGRESS NOTES
HISTORY OF PRESENT ILLNESS  Elder North is a 60 y.o. male. HPI       Patient here today routine f/u diabetes hypertension hyperlipidemia obesity MDD  Last a1c 6.3 LDL 85  Right foot pain x 1 month --dorsal pain and swelling. No injury per pt  Increased pain when weight bearing  fsbs at home around 115 recently per pt  No cp   Has mod copd but not much dyspnea or wheezes on symbicort  Overall active with exercise and yardwork    Last OV  Last VO 3 years ago  Due for pneumovax 23  Last a1c 7.1 ldl 144   fsbs around 110 in mornings  Some tingling in feet towards end of the day  Also weak urine stream x 6 months with nocturia x 2 on average           Patient Active Problem List    Diagnosis Date Noted    Umbilical hernia 40/33/0739    Diabetes mellitus ([facility] [address] 01/13/2012    Dyslipidemia 06/21/2011    Psoriasis 06/21/2011    Depression 06/21/2011     Current Outpatient Medications   Medication Sig Dispense Refill    tamsulosin (FLOMAX) 0.4 mg capsule Take 1 Cap by mouth daily. 30 Cap 6    metFORMIN (GLUCOPHAGE) 500 mg tablet take 2 tablets by mouth twice a day with food 360 Tab 3    pravastatin (PRAVACHOL) 20 mg tablet take 1 tablet by mouth once daily 90 Tab 3    escitalopram oxalate (LEXAPRO) 20 mg tablet take 1 tablet by mouth once daily 90 Tab 3    SITagliptin (JANUVIA) 100 mg tablet take 1 tablet by mouth once daily 90 Tab 3    budesonide-formoterol (SYMBICORT) 80-4.5 mcg/actuation HFAA Take 2 Puffs by inhalation two (2) times a day. 3 Inhaler 3    glucose blood VI test strips (ACCU-CHEK DORIS PLUS TEST STRP) strip TEST twice a day as directed 50 Strip 11    albuterol (PROVENTIL HFA, VENTOLIN HFA, PROAIR HFA) 90 mcg/actuation inhaler Take 2 Puffs by inhalation every four (4) hours as needed for Wheezing. 1 Inhaler 0    ALPRAZolam (XANAX) 0.25 mg tablet take 1 tablet by mouth NIGHTLY if needed for anxiety 30 Tab 1    Cetirizine (ZYRTEC) 10 mg cap Take  by mouth as needed.       omeprazole (PRILOSEC) 20 mg capsule Take 1 Cap by mouth daily. (Patient taking differently: Take 20 mg by mouth daily. TAKES IN AM) 20 Cap 0     Allergies   Allergen Reactions    Aspirin Other (comments)     PUD    Motrin [Ibuprofen] Rash     Social History     Tobacco Use    Smoking status: Former Smoker     Packs/day: 2.00     Years: 21.00     Pack years: 42.00     Types: Cigarettes     Last attempt to quit: 1998     Years since quittin.7    Smokeless tobacco: Never Used   Substance Use Topics    Alcohol use: Yes     Alcohol/week: 1.7 standard drinks     Types: 2 Cans of beer per week     Frequency: Monthly or less     Drinks per session: 1 or 2     Comment: sometimes      Lab Results   Component Value Date/Time    WBC 6.6 2019 12:38 PM    HGB 13.1 2019 12:38 PM    HCT 40.3 2019 12:38 PM    PLATELET 460  12:38 PM    MCV 84 2019 12:38 PM     Lab Results   Component Value Date/Time    Hemoglobin A1c 6.3 (H) 2019 12:38 PM    Hemoglobin A1c 7.2 (H) 10/17/2014 09:47 AM    Hemoglobin A1c 6.8 (H) 2013 08:32 AM    Glucose 87 2019 12:38 PM    Glucose (POC) 135 (H) 2015 04:56 PM    Microalb/Creat ratio (ug/mg creat.) 4.7 2019 12:38 PM    LDL, calculated 85 2019 12:38 PM    Creatinine 0.85 2019 12:38 PM      Lab Results   Component Value Date/Time    Cholesterol, total 165 2019 12:38 PM    HDL Cholesterol 44 2019 12:38 PM    LDL, calculated 85 2019 12:38 PM    Triglyceride 178 (H) 2019 12:38 PM     Lab Results   Component Value Date/Time    GFR est non-AA 95 2019 12:38 PM    GFR est  2019 12:38 PM    Creatinine 0.85 2019 12:38 PM    BUN 12 2019 12:38 PM    Sodium 143 2019 12:38 PM    Potassium 4.6 2019 12:38 PM    Chloride 103 2019 12:38 PM    CO2 25 2019 12:38 PM        ROS    Physical Exam   Constitutional: He appears well-developed and well-nourished. No distress.    Appears stated age   HENT:   Head: Normocephalic. Cardiovascular: Normal rate and regular rhythm. Exam reveals no gallop and no friction rub. No murmur heard. Pulmonary/Chest: Effort normal and breath sounds normal. He has no wheezes. He has no rales. He exhibits no tenderness. Abdominal: Soft. Musculoskeletal: He exhibits no edema. Neurological: He is alert. Psychiatric: He has a normal mood and affect. Nursing note and vitals reviewed. ASSESSMENT and PLAN  1. DM-2   Controlled per home readings on januvia and metformin   Cmp, lipids a1c       2. HTN   controlled    3. HLD   F/u lipids ordered  4. COPD   Moderate, stable on symbicort  5. Right foot pain   djd , ? Gout   Diclofenac bid prn   Xray ordered    6.  Preventive   prneumovax 23 today   Will get flu shot at work

## 2019-09-12 NOTE — PATIENT INSTRUCTIONS
Office Policies    Phone calls/patient messages:            Please allow up to 24 hours for someone in the office to contact you about your call or message. Be mindful your provider may be out of the office or your message may require further review. We encourage you to use [employer] for your messages as this is a faster, more efficient way to communicate with our office                         Medication Refills:            Prescription medications require 48-72 business hours to process. We encourage you to use [employer] for your refills. For controlled medications: Please allow 72 business hours to process. Certain medications may require you to  a written prescription at our office. NO narcotic/controlled medications will be prescribed after 4pm Monday through Friday or on weekends              Form/Paperwork Completion:            Please note a $25 fee may incur for all paperwork for completed by our providers. We ask that you allow 7-10 business days. Pre-payment is due prior to picking up/faxing the completed form. You may also download your forms to [employer] to have your doctor print off.

## 2019-09-12 NOTE — PROGRESS NOTES
Chief Complaint   Patient presents with    Diabetes     6 month follow up    Cholesterol Problem     6 month follow up    Labs     6 month follow up    Foot Pain     Right foot and swelling

## 2020-02-03 ENCOUNTER — TELEPHONE (OUTPATIENT)
Dept: INTERNAL MEDICINE CLINIC | Age: 60
End: 2020-02-03

## 2020-02-03 ENCOUNTER — OFFICE VISIT (OUTPATIENT)
Dept: INTERNAL MEDICINE CLINIC | Age: 60
End: 2020-02-03

## 2020-02-03 VITALS
TEMPERATURE: 97.8 F | OXYGEN SATURATION: 99 % | SYSTOLIC BLOOD PRESSURE: 137 MMHG | BODY MASS INDEX: 33.86 KG/M2 | HEIGHT: 72 IN | HEART RATE: 88 BPM | RESPIRATION RATE: 18 BRPM | WEIGHT: 250 LBS | DIASTOLIC BLOOD PRESSURE: 89 MMHG

## 2020-02-03 DIAGNOSIS — E11.9 TYPE 2 DIABETES MELLITUS WITHOUT COMPLICATION, WITHOUT LONG-TERM CURRENT USE OF INSULIN (HCC): ICD-10-CM

## 2020-02-03 DIAGNOSIS — R21 RASH OF BODY: Primary | ICD-10-CM

## 2020-02-03 RX ORDER — METHYLPREDNISOLONE 4 MG/1
TABLET ORAL
Qty: 1 DOSE PACK | Refills: 0 | Status: SHIPPED | OUTPATIENT
Start: 2020-02-03 | End: 2021-03-22

## 2020-02-03 RX ORDER — CEPHALEXIN 500 MG/1
500 CAPSULE ORAL 2 TIMES DAILY
Qty: 14 CAP | Refills: 0 | Status: SHIPPED | OUTPATIENT
Start: 2020-02-03 | End: 2020-02-10

## 2020-02-03 NOTE — PATIENT INSTRUCTIONS
Office Policies Phone calls/patient messages: Please allow up to 24 hours for someone in the office to contact you about your call or message. Be mindful your provider may be out of the office or your message may require further review. We encourage you to use [employer] for your messages as this is a faster, more efficient way to communicate with our office Medication Refills: 
         
Prescription medications require 48-72 business hours to process. We encourage you to use [employer] for your refills. For controlled medications: Please allow 72 business hours to process. Certain medications may require you to  a written prescription at our office. NO narcotic/controlled medications will be prescribed after 4pm Monday through Friday or on weekends Form/Paperwork Completion: 
         
Please note a $25 fee may incur for all paperwork for completed by our providers. We ask that you allow 7-10 business days. Pre-payment is due prior to picking up/faxing the completed form. You may also download your forms to [employer] to have your doctor print off.

## 2020-02-03 NOTE — PROGRESS NOTES
Reviewed record in preparation for visit and have obtained necessary documentation. Identified pt with two pt identifiers(name and ). Chief Complaint   Patient presents with   [address] [facility] Due   Topic Date Due    Eye Exam  1970    DTaP/Tdap/Td  (1 - Tdap) 1970    Shingles Vaccine (1 of 2) 2009    Hemoglobin A1C    2019       Mr. Kenrick Hall has a reminder for a \"due or due soon\" health maintenance. I have asked that he discuss this further with his primary care provider for follow-up on this health maintenance. Coordination of Care Questionnaire:  :     1) Have you been to an emergency room, urgent care clinic since your last visit? no   Hospitalized since your last visit? no             2) Have you seen or consulted any other health care providers outside of [address] [place] [address] since your last visit? no  (Include any pap smears or colon screenings in this section.)    3) In the event something were to happen to you and you were unable to speak on your behalf, do you have an Advance Directive/ Living Will in place stating your wishes? NO    Do you have an Advance Directive on file? no    4) Are you interested in receiving information on Advance Directives? NO    Patient is accompanied by self I have received verbal consent from Guido Ramsay to discuss any/all medical information while they are present in the room.

## 2020-02-03 NOTE — PROGRESS NOTES
SUBJECTIVE:   Mr. Kiah Srinivasan is a 60 y.o. male who is here c/o a skin problem. Pt c/o a rash on his left flank and upper chest. Pt reports that the sx started 1.5 weeks ago on his upper chest. He denies any changes in medications, arthralgia, CP, SOB,  abrasions or bug bites prior to the rash outbreak. He states that the rash is severely itchy and warm, and isolated to his trunk. He states that the rash is not tender. He has been using Benadryl for management. Pt reports that his average BG has been 110. Pt reports that he had a head cold recently that he managed with OTC medication. He reports that he is no longer experiencing those sx. Pt's PCP is Dr. Mily Dominguez. At this time, he is otherwise doing well and has brought no other complaints to my attention today. PMH:   Past Medical History:   Diagnosis Date    Diabetes ([address]     Dyspepsia and other specified disorders of function of stomach     GERD (gastroesophageal reflux disease)     Hemorrhoids     Psoriasis     Psoriasis     Umbilical hernia     Umbilical hernia 7/20/8397     PSH:  has a past surgical history that includes hx appendectomy; hx tonsillectomy; and hx hernia repair (6-9-15). All: is allergic to aspirin and motrin [ibuprofen]. MEDS:   Current Outpatient Medications   Medication Sig    methylPREDNISolone (MEDROL DOSEPACK) 4 mg tablet Take as directed.  cephALEXin (KEFLEX) 500 mg capsule Take 1 Cap by mouth two (2) times a day for 7 days.  tamsulosin (FLOMAX) 0.4 mg capsule TAKE 1 CAPSULE BY MOUTH ONCE DAILY    diclofenac EC (VOLTAREN) 50 mg EC tablet Take 1 Tab by mouth two (2) times a day.     metFORMIN (GLUCOPHAGE) 500 mg tablet take 2 tablets by mouth twice a day with food    pravastatin (PRAVACHOL) 20 mg tablet take 1 tablet by mouth once daily    escitalopram oxalate (LEXAPRO) 20 mg tablet take 1 tablet by mouth once daily    SITagliptin (JANUVIA) 100 mg tablet take 1 tablet by mouth once daily    budesonide-formoterol (SYMBICORT) 80-4.5 mcg/actuation HFAA Take 2 Puffs by inhalation two (2) times a day.  glucose blood VI test strips (ACCU-CHEK DORIS PLUS TEST STRP) strip TEST twice a day as directed    albuterol (PROVENTIL HFA, VENTOLIN HFA, PROAIR HFA) 90 mcg/actuation inhaler Take 2 Puffs by inhalation every four (4) hours as needed for Wheezing.  ALPRAZolam (XANAX) 0.25 mg tablet take 1 tablet by mouth NIGHTLY if needed for anxiety    Cetirizine (ZYRTEC) 10 mg cap Take  by mouth as needed.  omeprazole (PRILOSEC) 20 mg capsule Take 1 Cap by mouth daily. (Patient taking differently: Take 20 mg by mouth daily. TAKES IN AM)     No current facility-administered medications for this visit. FH: family history includes Diabetes in his father; Heart Attack in his father. SH:  reports that he quit smoking about 22 years ago. His smoking use included cigarettes. He has a 42.00 pack-year smoking history. He has never used smokeless tobacco. He reports current alcohol use of about 1.7 standard drinks of alcohol per week. He reports that he does not use drugs. Review of Systems - History obtained from the patient  General ROS: negative  Psychological ROS: negative  Ophthalmic ROS: negative  ENT ROS: negative  Respiratory ROS: no cough, shortness of breath, or wheezing  Cardiovascular ROS: no chest pain or dyspnea on exertion  Gastrointestinal ROS: no abdominal pain, change in bowel habits, or black or bloody stools  Genito-Urinary ROS: negative  Musculoskeletal ROS: negative  Neurological ROS: negative  Dermatological ROS: +rash on upper chest and left flank. OBJECTIVE:   Vitals:   Visit Vitals  /89 (BP 1 Location: Right arm, BP Patient Position: Sitting)   Pulse 88   Temp 97.8 °F (36.6 °C) (Oral)   Resp 18   Ht 6' (1.829 m)   Wt 250 lb (113.4 kg)   SpO2 99%   BMI 33.91 kg/m²      Gen: Pleasant 60 y.o.  male in NAD. HEENT: NC/AT. HEART: RRR, No M/G/R.   LUNGS: CTAB No W/R.    SKIN: +erythematous macular papular donut shaped lesion encompassing the left anterior trunk and anterior upper chest.     ASSESSMENT/ PLAN:     Diagnoses and all orders for this visit:    1. Rash of body  -     CBC WITH AUTOMATED DIFF  -     METABOLIC PANEL, COMPREHENSIVE  -     methylPREDNISolone (MEDROL DOSEPACK) 4 mg tablet; Take as directed. -     cephALEXin (KEFLEX) 500 mg capsule; Take 1 Cap by mouth two (2) times a day for 7 days. 2. Type 2 diabetes mellitus without complication, without long-term current use of insulin (HCC)  -     METABOLIC PANEL, COMPREHENSIVE      1. Rash of Body  Check CMP and CBC. If CBC is normal he will discontinue the Keflex. I prescribed Keflex 500 mg capsule BID for 7 days for treatment and methylprednisolone 4 mg tablet PRN for sx management. 2. DM type 2  Pt's blood sugar seems to be well controlled. I advised pt to continue current dose of Januvia 100 mg tablet every day and metformin 500 mg tablet BID. He was counseled about avoiding sugars and starches, and to increase exercise while on the steroid. Recheck CMP. Follow-up and Dispositions    · Return if symptoms worsen or fail to improve. I have reviewed the patient's medications and risks/side effects/benefits were discussed. Diagnosis(-es) explained to patient and questions answered. Literature provided where appropriate.      Written by Jessica Harrison, as dictated by Jean Urban MD.

## 2020-02-03 NOTE — TELEPHONE ENCOUNTER
#[phone] wife, Silverio Solorio states pt has cellulitis on his chest and stomach. Pt also has a cold and needs to be seen as soon as possible. Please call Silverio Lyndsey to schedule.

## 2020-02-04 LAB
ALBUMIN SERPL-MCNC: 3.9 G/DL (ref 3.8–4.9)
ALBUMIN/GLOB SERPL: 1.3 {RATIO} (ref 1.2–2.2)
ALP SERPL-CCNC: 53 IU/L (ref 39–117)
ALT SERPL-CCNC: 22 IU/L (ref 0–44)
AST SERPL-CCNC: 18 IU/L (ref 0–40)
BASOPHILS # BLD AUTO: 0.1 X10E3/UL (ref 0–0.2)
BASOPHILS NFR BLD AUTO: 1 %
BILIRUB SERPL-MCNC: 0.2 MG/DL (ref 0–1.2)
BUN SERPL-MCNC: 12 MG/DL (ref 8–27)
BUN/CREAT SERPL: 11 (ref 10–24)
CALCIUM SERPL-MCNC: 9.1 MG/DL (ref 8.6–10.2)
CHLORIDE SERPL-SCNC: 100 MMOL/L (ref 96–106)
CO2 SERPL-SCNC: 26 MMOL/L (ref 20–29)
CREAT SERPL-MCNC: 1.05 MG/DL (ref 0.76–1.27)
EOSINOPHIL # BLD AUTO: 0.5 X10E3/UL (ref 0–0.4)
EOSINOPHIL NFR BLD AUTO: 8 %
ERYTHROCYTE [DISTWIDTH] IN BLOOD BY AUTOMATED COUNT: 13.4 % (ref 11.6–15.4)
GLOBULIN SER CALC-MCNC: 2.9 G/DL (ref 1.5–4.5)
GLUCOSE SERPL-MCNC: 90 MG/DL (ref 65–99)
HCT VFR BLD AUTO: 40.2 % (ref 37.5–51)
HGB BLD-MCNC: 13.4 G/DL (ref 13–17.7)
IMM GRANULOCYTES # BLD AUTO: 0 X10E3/UL (ref 0–0.1)
IMM GRANULOCYTES NFR BLD AUTO: 0 %
LYMPHOCYTES # BLD AUTO: 2 X10E3/UL (ref 0.7–3.1)
LYMPHOCYTES NFR BLD AUTO: 30 %
MCH RBC QN AUTO: 28.1 PG (ref 26.6–33)
MCHC RBC AUTO-ENTMCNC: 33.3 G/DL (ref 31.5–35.7)
MCV RBC AUTO: 84 FL (ref 79–97)
MONOCYTES # BLD AUTO: 0.7 X10E3/UL (ref 0.1–0.9)
MONOCYTES NFR BLD AUTO: 11 %
NEUTROPHILS # BLD AUTO: 3.2 X10E3/UL (ref 1.4–7)
NEUTROPHILS NFR BLD AUTO: 50 %
PLATELET # BLD AUTO: 222 X10E3/UL (ref 150–450)
POTASSIUM SERPL-SCNC: 4.1 MMOL/L (ref 3.5–5.2)
PROT SERPL-MCNC: 6.8 G/DL (ref 6–8.5)
RBC # BLD AUTO: 4.77 X10E6/UL (ref 4.14–5.8)
SODIUM SERPL-SCNC: 141 MMOL/L (ref 134–144)
WBC # BLD AUTO: 6.5 X10E3/UL (ref 3.4–10.8)

## 2020-02-12 NOTE — PROGRESS NOTES
CBC-Normal red and white blood cell levels. CMP-Normal electrolyte levels, renal, and liver function.

## 2020-02-20 NOTE — PROGRESS NOTES
Called, no answer left message to call office back.     Left msg on v/m of lab results per Dr. Elaine Patten.

## 2020-02-20 NOTE — PROGRESS NOTES
Called, no answer left message to call office back.     Left msg on v/m of lab results per Dr. Jo Chapman.

## 2020-03-16 RX ORDER — METFORMIN HYDROCHLORIDE 500 MG/1
TABLET ORAL
Qty: 360 TAB | Refills: 3 | Status: SHIPPED | OUTPATIENT
Start: 2020-03-16 | End: 2020-04-08

## 2020-03-16 RX ORDER — PRAVASTATIN SODIUM 20 MG/1
TABLET ORAL
Qty: 90 TAB | Refills: 3 | Status: SHIPPED | OUTPATIENT
Start: 2020-03-16 | End: 2021-03-22 | Stop reason: SDUPTHER

## 2020-03-16 NOTE — TELEPHONE ENCOUNTER
PCP: Rayshawn Hand MD    Last appt: 2/3/2020  No future appointments.     Requested Prescriptions     Pending Prescriptions Disp Refills    metFORMIN (GLUCOPHAGE) 500 mg tablet 360 Tab 3     Sig: take 2 tablets by mouth twice a day with food

## 2020-03-16 NOTE — TELEPHONE ENCOUNTER
PCP: Ramiro Peres MD    Last appt: 2/3/2020  No future appointments.     Requested Prescriptions     Pending Prescriptions Disp Refills    pravastatin (PRAVACHOL) 20 mg tablet 90 Tab 3     Sig: take 1 tablet by mouth once daily

## 2020-03-20 RX ORDER — ESCITALOPRAM OXALATE 20 MG/1
TABLET ORAL
Qty: 90 TAB | Refills: 3 | Status: SHIPPED | OUTPATIENT
Start: 2020-03-20 | End: 2021-06-14 | Stop reason: SDUPTHER

## 2020-04-08 DIAGNOSIS — N40.1 BENIGN PROSTATIC HYPERPLASIA WITH WEAK URINARY STREAM: ICD-10-CM

## 2020-04-08 DIAGNOSIS — R39.12 BENIGN PROSTATIC HYPERPLASIA WITH WEAK URINARY STREAM: ICD-10-CM

## 2020-04-08 RX ORDER — TAMSULOSIN HYDROCHLORIDE 0.4 MG/1
CAPSULE ORAL
Qty: 30 CAP | Refills: 11 | Status: SHIPPED | OUTPATIENT
Start: 2020-04-08 | End: 2021-03-22 | Stop reason: SDUPTHER

## 2020-04-08 RX ORDER — METFORMIN HYDROCHLORIDE 500 MG/1
TABLET ORAL
Qty: 360 TAB | Refills: 3 | Status: SHIPPED | OUTPATIENT
Start: 2020-04-08 | End: 2021-03-22 | Stop reason: SDUPTHER

## 2020-06-23 DIAGNOSIS — Z20.822 CLOSE EXPOSURE TO COVID-19 VIRUS: Primary | ICD-10-CM

## 2021-03-17 ENCOUNTER — TELEPHONE (OUTPATIENT)
Dept: INTERNAL MEDICINE CLINIC | Age: 61
End: 2021-03-17

## 2021-03-17 DIAGNOSIS — R07.81 RIB PAIN: Primary | ICD-10-CM

## 2021-03-17 RX ORDER — LIDOCAINE 50 MG/G
1 PATCH TOPICAL EVERY 24 HOURS
Qty: 30 EACH | Refills: 0 | Status: SHIPPED | OUTPATIENT
Start: 2021-03-17

## 2021-03-17 RX ORDER — HYDROCODONE BITARTRATE AND ACETAMINOPHEN 5; 325 MG/1; MG/1
1 TABLET ORAL
Qty: 21 TAB | Refills: 0 | Status: SHIPPED | OUTPATIENT
Start: 2021-03-17 | End: 2021-03-24

## 2021-03-17 NOTE — TELEPHONE ENCOUNTER
Message  Received: Today  Message Contents   MD Kristin Joseph LPN   Caller: Unspecified (Today, 12:38 PM)             I escribed lortab and lidoderm patches     Spoke with patient. Two pt identifiers confirmed. Patient advised that Dr. Umm Baeza has sent in a prescription for Lortab and lidoderm patches to his pharmacy. Patient states that he needs to schedule an appointment with Dr. Umm Baeza. Patient states that while he was at the hospital they found nodules on his liver and his thyroid. Patient offered an appointment with Dr. Umm Baeza on 03/22/2021. Appointment accepted. Pt verbalized understanding of information discussed w/ no further questions at this time.    Patient advised if anything changes or if unable to keep this appointment to call the office

## 2021-03-17 NOTE — TELEPHONE ENCOUNTER
----- Message from Abril Cueto sent at 3/17/2021 12:21 PM EDT -----  Regarding: Dr. Leonidas Fung Message/Vendor Calls    Caller's first and last name: n/a      Reason for call: Needs refill for pain pills and lidocane patches 5%. Has 3 broken ribs and went to ER and was given pain pills and is now out. Needs approval for refill.        Callback required yes/no and why: yes      Best contact number(s): [phone]      Message from [facility]

## 2021-03-21 NOTE — PROGRESS NOTES
HISTORY OF PRESENT ILLNESS  Ela Solders is a 63 y.o. male. HPI   Patient here today routine f/u diabetes hypertension hyperlipidemia obesity MDD copd-moderate    Last a1c 6.3 LDL 85  fsbs avg around 120  No cp sob   Has pain in feet and wants rx for voltaren gel  bp has been high at home 701 range systolic    Found to have and liver nodules at [facility] in [facility] by CT scan  Alphonse 2 weeks ago and fractured 3 ribs left upper chest-nondisplaced  Took oxycodone then  Hydrocodone  Pain can be severe with deep breaths but improving       Last OV       Last a1c 6.3 LDL 85  Right foot pain x 1 month --dorsal pain and swelling. No injury per pt  Increased pain when weight bearing  fsbs at home around 115 recently per pt  No cp   Has mod copd but not much dyspnea or wheezes on symbicort  Overall active with exercise and yardwork    Patient Active Problem List    Diagnosis Date Noted    Pulmonary emphysema ([facility] [address] 36/72/9973    Umbilical hernia 23/75/8604    Diabetes mellitus ([facility] [address] 01/13/2012    Dyslipidemia 06/21/2011    Psoriasis 06/21/2011    Depression 06/21/2011     Current Outpatient Medications   Medication Sig Dispense Refill    HYDROcodone-acetaminophen (NORCO) 5-325 mg per tablet Take 1 Tab by mouth every eight (8) hours as needed for Pain for up to 7 days. Max Daily Amount: 3 Tabs. 21 Tab 0    lidocaine (LIDODERM) 5 % 1 Patch by TransDERmal route every twenty-four (24) hours. Apply patch to the affected area for 12 hours a day and remove for 12 hours a day.  30 Each 0    metFORMIN (GLUCOPHAGE) 500 mg tablet TAKE 2 TABLETS BY MOUTH TWICE A DAY WITH FOOD 360 Tab 3    tamsulosin (FLOMAX) 0.4 mg capsule TAKE 1 CAPSULE BY MOUTH ONCE DAILY 30 Cap 11    escitalopram oxalate (LEXAPRO) 20 mg tablet TAKE 1 TABLET BY MOUTH ONCE DAILY 90 Tab 3    SITagliptin (Januvia) 100 mg tablet take 1 tablet by mouth once daily 90 Tab 3    glucose blood VI test strips (Accu-Chek Ciera Plus test strp) strip TEST twice a day as directed 50 Strip 11    pravastatin (PRAVACHOL) 20 mg tablet take 1 tablet by mouth once daily 90 Tab 3    SITagliptin (Januvia) 100 mg tablet TAKE 1 TABLET BY MOUTH ONCE DAILY 90 Tab 3    methylPREDNISolone (MEDROL DOSEPACK) 4 mg tablet Take as directed. 1 Dose Pack 0    diclofenac EC (VOLTAREN) 50 mg EC tablet Take 1 Tab by mouth two (2) times a day. 30 Tab 0    budesonide-formoterol (SYMBICORT) 80-4.5 mcg/actuation HFAA Take 2 Puffs by inhalation two (2) times a day. 3 Inhaler 3    albuterol (PROVENTIL HFA, VENTOLIN HFA, PROAIR HFA) 90 mcg/actuation inhaler Take 2 Puffs by inhalation every four (4) hours as needed for Wheezing. 1 Inhaler 0    ALPRAZolam (XANAX) 0.25 mg tablet take 1 tablet by mouth NIGHTLY if needed for anxiety 30 Tab 1    Cetirizine (ZYRTEC) 10 mg cap Take  by mouth as needed.  omeprazole (PRILOSEC) 20 mg capsule Take 1 Cap by mouth daily. (Patient taking differently: Take 20 mg by mouth daily. TAKES IN AM) 20 Cap 0     Allergies   Allergen Reactions    Aspirin Other (comments)     PUD    Motrin [Ibuprofen] Rash     Social History     Tobacco Use    Smoking status: Former Smoker     Packs/day: 2.00     Years: 21.00     Pack years: 42.00     Types: Cigarettes     Quit date: 1998     Years since quittin.2    Smokeless tobacco: Never Used   Substance Use Topics    Alcohol use:  Yes     Alcohol/week: 1.7 standard drinks     Types: 2 Cans of beer per week     Frequency: Monthly or less     Drinks per session: 1 or 2     Comment: sometimes      Lab Results   Component Value Date/Time    WBC 6.5 2020 04:04 PM    HGB 13.4 2020 04:04 PM    HCT 40.2 2020 04:04 PM    PLATELET 430  04:04 PM    MCV 84 2020 04:04 PM     Lab Results   Component Value Date/Time    Hemoglobin A1c 6.3 (H) 2019 12:38 PM    Hemoglobin A1c 7.2 (H) 10/17/2014 09:47 AM    Hemoglobin A1c 6.8 (H) 2013 08:32 AM    Glucose 90 2020 04:04 PM    Glucose (POC) 135 (H) 06/09/2015 04:56 PM    Microalb/Creat ratio (ug/mg creat.) 4.7 03/13/2019 12:38 PM    LDL, calculated 85 03/13/2019 12:38 PM    Creatinine 1.05 02/03/2020 04:04 PM      Lab Results   Component Value Date/Time    Cholesterol, total 165 03/13/2019 12:38 PM    HDL Cholesterol 44 03/13/2019 12:38 PM    LDL, calculated 85 03/13/2019 12:38 PM    Triglyceride 178 (H) 03/13/2019 12:38 PM     Lab Results   Component Value Date/Time    GFR est non-AA 77 02/03/2020 04:04 PM    GFR est AA 89 02/03/2020 04:04 PM    Creatinine 1.05 02/03/2020 04:04 PM    BUN 12 02/03/2020 04:04 PM    Sodium 141 02/03/2020 04:04 PM    Potassium 4.1 02/03/2020 04:04 PM    Chloride 100 02/03/2020 04:04 PM    CO2 26 02/03/2020 04:04 PM     Lab Results   Component Value Date/Time    Prostate Specific Ag 0.8 03/13/2019 12:38 PM    Prostate Specific Ag 0.9 10/17/2014 09:47 AM    Prostate Specific Ag 0.9 07/25/2013 08:32 AM     Lab Results   Component Value Date/Time    Glucose 90 02/03/2020 04:04 PM    Glucose (POC) 135 (H) 06/09/2015 04:56 PM         ROS    Physical Exam  Vitals signs and nursing note reviewed. Constitutional:       General: He is not in acute distress. Appearance: He is well-developed. He is obese. Comments: Appears stated age   HENT:      Head: Normocephalic. Neck:      Comments: No thyroid nodules palpated  Cardiovascular:      Rate and Rhythm: Normal rate and regular rhythm. Heart sounds: Normal heart sounds. Pulmonary:      Effort: Pulmonary effort is normal.      Breath sounds: Normal breath sounds. Abdominal:      General: Bowel sounds are normal.      Palpations: Abdomen is soft. Tenderness: There is no abdominal tenderness. There is no guarding. Neurological:      Mental Status: He is alert. ASSESSMENT and PLAN  Diagnoses and all orders for this visit:    1.  Controlled type 2 diabetes mellitus without complication, without long-term current use of insulin (HCC)  -     METABOLIC PANEL, COMPREHENSIVE  -     HEMOGLOBIN A1C WITH EAG  -     MICROALBUMIN, UR, RAND W/ MICROALB/CREAT RATIO    2. Essential hypertension  -     METABOLIC PANEL, COMPREHENSIVE  -     CBC W/O DIFF   Start benicar 20 mg qd  3. Pure hypercholesterolemia  -     METABOLIC PANEL, COMPREHENSIVE  -     LIPID PANEL    4. Chronic obstructive pulmonary disease, unspecified COPD type ([facility] [address]   Not symptomatic   No longer using symbicort  5. Prostate cancer screening  -     PSA SCREENING (SCREENING)    6. Closed fracture of multiple ribs of left side, initial encounter   Will use otc nsaids for pain  7. Benign prostatic hyperplasia with weak urinary stream  -     tamsulosin (FLOMAX) 0.4 mg capsule; TAKE 1 CAPSULE BY MOUTH ONCE DAILY    8. Thyroid nodule  -     US THYROID/PARATHYROID/SOFT TISS; Future    9. Liver nodule  -     US THYROID/PARATHYROID/SOFT TISS; Future  -     US ABD COMP; Future    Other orders  -     lidocaine (LIDODERM) 5 %; 1 Patch by TransDERmal route every twenty-four (24) hours. Apply patch to the affected area for 12 hours a day and remove for 12 hours a day. -     diclofenac (VOLTAREN) 1 % gel; Apply 2 g to affected area four (4) times daily. -     olmesartan (BENICAR) 20 mg tablet; Take 1 Tab by mouth daily.  -     SITagliptin (Januvia) 100 mg tablet; TAKE 1 TABLET BY MOUTH ONCE DAILY  -     pravastatin (PRAVACHOL) 20 mg tablet; take 1 tablet by mouth once daily  -     metFORMIN (GLUCOPHAGE) 500 mg tablet; One every day  -     glucose blood VI test strips (Accu-Chek Ciera Plus test strp) strip; TEST twice a day as directed    Nurse BP check in 4 weeks  Follow-up and Dispositions    · Return in about 6 months (around 9/22/2021) for dm-2 htn htn HDD-CPE.

## 2021-03-22 ENCOUNTER — OFFICE VISIT (OUTPATIENT)
Dept: INTERNAL MEDICINE CLINIC | Age: 61
End: 2021-03-22
Payer: COMMERCIAL

## 2021-03-22 VITALS
HEIGHT: 72 IN | HEART RATE: 95 BPM | WEIGHT: 253 LBS | SYSTOLIC BLOOD PRESSURE: 149 MMHG | DIASTOLIC BLOOD PRESSURE: 75 MMHG | OXYGEN SATURATION: 95 % | BODY MASS INDEX: 34.27 KG/M2 | TEMPERATURE: 98.6 F | RESPIRATION RATE: 16 BRPM

## 2021-03-22 DIAGNOSIS — K76.89 LIVER NODULE: ICD-10-CM

## 2021-03-22 DIAGNOSIS — J44.9 CHRONIC OBSTRUCTIVE PULMONARY DISEASE, UNSPECIFIED COPD TYPE (HCC): ICD-10-CM

## 2021-03-22 DIAGNOSIS — E78.00 PURE HYPERCHOLESTEROLEMIA: ICD-10-CM

## 2021-03-22 DIAGNOSIS — Z12.5 PROSTATE CANCER SCREENING: ICD-10-CM

## 2021-03-22 DIAGNOSIS — S22.42XA CLOSED FRACTURE OF MULTIPLE RIBS OF LEFT SIDE, INITIAL ENCOUNTER: ICD-10-CM

## 2021-03-22 DIAGNOSIS — E04.1 THYROID NODULE: ICD-10-CM

## 2021-03-22 DIAGNOSIS — N40.1 BENIGN PROSTATIC HYPERPLASIA WITH WEAK URINARY STREAM: ICD-10-CM

## 2021-03-22 DIAGNOSIS — I10 ESSENTIAL HYPERTENSION: ICD-10-CM

## 2021-03-22 DIAGNOSIS — R39.12 BENIGN PROSTATIC HYPERPLASIA WITH WEAK URINARY STREAM: ICD-10-CM

## 2021-03-22 DIAGNOSIS — E11.9 CONTROLLED TYPE 2 DIABETES MELLITUS WITHOUT COMPLICATION, WITHOUT LONG-TERM CURRENT USE OF INSULIN (HCC): Primary | ICD-10-CM

## 2021-03-22 PROCEDURE — 99214 OFFICE O/P EST MOD 30 MIN: CPT | Performed by: INTERNAL MEDICINE

## 2021-03-22 RX ORDER — METFORMIN HYDROCHLORIDE 500 MG/1
TABLET ORAL
Qty: 360 TAB | Refills: 3 | Status: SHIPPED | OUTPATIENT
Start: 2021-03-22 | End: 2021-05-03 | Stop reason: SDUPTHER

## 2021-03-22 RX ORDER — OLMESARTAN MEDOXOMIL 20 MG/1
20 TABLET ORAL DAILY
Qty: 90 TAB | Refills: 1 | Status: SHIPPED | OUTPATIENT
Start: 2021-03-22 | End: 2021-06-23 | Stop reason: SDUPTHER

## 2021-03-22 RX ORDER — BLOOD SUGAR DIAGNOSTIC
STRIP MISCELLANEOUS
Qty: 50 STRIP | Refills: 11 | Status: SHIPPED | OUTPATIENT
Start: 2021-03-22 | End: 2021-10-13

## 2021-03-22 RX ORDER — PRAVASTATIN SODIUM 20 MG/1
TABLET ORAL
Qty: 90 TAB | Refills: 3 | Status: SHIPPED | OUTPATIENT
Start: 2021-03-22 | End: 2022-01-07

## 2021-03-22 RX ORDER — TAMSULOSIN HYDROCHLORIDE 0.4 MG/1
CAPSULE ORAL
Qty: 90 CAP | Refills: 3 | Status: SHIPPED | OUTPATIENT
Start: 2021-03-22 | End: 2022-01-06

## 2021-03-22 RX ORDER — LIDOCAINE 50 MG/G
1 PATCH TOPICAL EVERY 24 HOURS
Qty: 30 EACH | Refills: 1 | Status: SHIPPED | OUTPATIENT
Start: 2021-03-22 | End: 2021-04-19

## 2021-03-22 RX ORDER — DICLOFENAC SODIUM 10 MG/G
2 GEL TOPICAL 4 TIMES DAILY
Qty: 100 G | Refills: 5 | Status: SHIPPED | OUTPATIENT
Start: 2021-03-22

## 2021-03-22 NOTE — PATIENT INSTRUCTIONS
Office Policies Phone calls/patient messages: Please allow up to 24 hours for someone in the office to contact you about your call or message. Be mindful your provider may be out of the office or your message may require further review. We encourage you to use [email or web address] for your messages as this is a faster, more efficient way to communicate with our office Medication Refills: 
         
Prescription medications require 48-72 business hours to process. We encourage you to use [email or web address] for your refills. For controlled medications: Please allow 72 business hours to process. Certain medications may require you to  a written prescription at our office. NO narcotic/controlled medications will be prescribed after 4pm Monday through Friday or on weekends Form/Paperwork Completion: 
         
Please note a $25 fee may incur for all paperwork for completed by our providers. We ask that you allow 7-10 business days. Pre-payment is due prior to picking up/faxing the completed form. You may also download your forms to [email or web address] to have your doctor print off.

## 2021-03-27 LAB
ALBUMIN SERPL-MCNC: 4.2 G/DL (ref 3.8–4.8)
ALBUMIN/CREAT UR: 8 MG/G CREAT (ref 0–29)
ALBUMIN/GLOB SERPL: 1.6 {RATIO} (ref 1.2–2.2)
ALP SERPL-CCNC: 68 IU/L (ref 39–117)
ALT SERPL-CCNC: 27 IU/L (ref 0–44)
AST SERPL-CCNC: 18 IU/L (ref 0–40)
BILIRUB SERPL-MCNC: <0.2 MG/DL (ref 0–1.2)
BUN SERPL-MCNC: 16 MG/DL (ref 8–27)
BUN/CREAT SERPL: 20 (ref 10–24)
CALCIUM SERPL-MCNC: 9.6 MG/DL (ref 8.6–10.2)
CHLORIDE SERPL-SCNC: 102 MMOL/L (ref 96–106)
CHOLEST SERPL-MCNC: 155 MG/DL (ref 100–199)
CO2 SERPL-SCNC: 26 MMOL/L (ref 20–29)
CREAT SERPL-MCNC: 0.81 MG/DL (ref 0.76–1.27)
CREAT UR-MCNC: 137.2 MG/DL
ERYTHROCYTE [DISTWIDTH] IN BLOOD BY AUTOMATED COUNT: 14.1 % (ref 11.6–15.4)
EST. AVERAGE GLUCOSE BLD GHB EST-MCNC: 134 MG/DL
GLOBULIN SER CALC-MCNC: 2.7 G/DL (ref 1.5–4.5)
GLUCOSE SERPL-MCNC: 120 MG/DL (ref 65–99)
HBA1C MFR BLD: 6.3 % (ref 4.8–5.6)
HCT VFR BLD AUTO: 39.8 % (ref 37.5–51)
HDLC SERPL-MCNC: 47 MG/DL
HGB BLD-MCNC: 13.1 G/DL (ref 13–17.7)
LDLC SERPL CALC-MCNC: 80 MG/DL (ref 0–99)
MCH RBC QN AUTO: 27.9 PG (ref 26.6–33)
MCHC RBC AUTO-ENTMCNC: 32.9 G/DL (ref 31.5–35.7)
MCV RBC AUTO: 85 FL (ref 79–97)
MICROALBUMIN UR-MCNC: 10.6 UG/ML
PLATELET # BLD AUTO: 234 X10E3/UL (ref 150–450)
POTASSIUM SERPL-SCNC: 4.8 MMOL/L (ref 3.5–5.2)
PROT SERPL-MCNC: 6.9 G/DL (ref 6–8.5)
PSA SERPL-MCNC: 0.5 NG/ML (ref 0–4)
RBC # BLD AUTO: 4.7 X10E6/UL (ref 4.14–5.8)
SODIUM SERPL-SCNC: 141 MMOL/L (ref 134–144)
TRIGL SERPL-MCNC: 166 MG/DL (ref 0–149)
VLDLC SERPL CALC-MCNC: 28 MG/DL (ref 5–40)
WBC # BLD AUTO: 7.7 X10E3/UL (ref 3.4–10.8)

## 2021-03-31 ENCOUNTER — APPOINTMENT (OUTPATIENT)
Dept: ULTRASOUND IMAGING | Age: 61
End: 2021-03-31
Attending: INTERNAL MEDICINE

## 2021-04-19 ENCOUNTER — CLINICAL SUPPORT (OUTPATIENT)
Dept: INTERNAL MEDICINE CLINIC | Age: 61
End: 2021-04-19

## 2021-04-19 VITALS
DIASTOLIC BLOOD PRESSURE: 72 MMHG | SYSTOLIC BLOOD PRESSURE: 125 MMHG | RESPIRATION RATE: 16 BRPM | HEART RATE: 78 BPM | TEMPERATURE: 96.1 F | WEIGHT: 250 LBS | HEIGHT: 72 IN | BODY MASS INDEX: 33.86 KG/M2 | OXYGEN SATURATION: 98 %

## 2021-04-19 DIAGNOSIS — I10 ESSENTIAL HYPERTENSION: Primary | ICD-10-CM

## 2021-05-03 NOTE — TELEPHONE ENCOUNTER
Pt needs to have the metformin resent to [facility] mail order as it was sent wrong. This needs to be sent today as he is running out and will need time to get there. Pt takes metformin 4 pills per day and NOT 1 pill per day. Can this be sent in correctly today?

## 2021-05-04 RX ORDER — METFORMIN HYDROCHLORIDE 500 MG/1
TABLET ORAL
Qty: 360 TAB | Refills: 3 | Status: SHIPPED | OUTPATIENT
Start: 2021-05-04 | End: 2022-03-02

## 2021-05-10 ENCOUNTER — TELEPHONE (OUTPATIENT)
Dept: INTERNAL MEDICINE CLINIC | Age: 61
End: 2021-05-10

## 2021-05-10 NOTE — TELEPHONE ENCOUNTER
Patient called in stating stating his BP medication is making him dizzy and light headed after he exercising/ doing yard work.    Patient did not take medication today 5/10

## 2021-05-12 NOTE — TELEPHONE ENCOUNTER
Called patient, unable to reach patient at this time. No personal VM, left generic message to return call to office at earliest convenience. Awaiting call back at this time.

## 2021-06-14 RX ORDER — ESCITALOPRAM OXALATE 20 MG/1
TABLET ORAL
Qty: 90 TABLET | Refills: 3 | Status: SHIPPED | OUTPATIENT
Start: 2021-06-14 | End: 2022-08-03

## 2021-06-23 RX ORDER — OLMESARTAN MEDOXOMIL 20 MG/1
20 TABLET ORAL DAILY
Qty: 90 TABLET | Refills: 1 | Status: SHIPPED | OUTPATIENT
Start: 2021-06-23 | End: 2022-01-01

## 2021-09-03 RX ORDER — ALBUTEROL SULFATE 90 UG/1
2 AEROSOL, METERED RESPIRATORY (INHALATION)
Qty: 1 EACH | Refills: 5 | Status: SHIPPED | OUTPATIENT
Start: 2021-09-03

## 2021-09-03 RX ORDER — BUDESONIDE AND FORMOTEROL FUMARATE DIHYDRATE 80; 4.5 UG/1; UG/1
2 AEROSOL RESPIRATORY (INHALATION) 2 TIMES DAILY
Qty: 1 EACH | Refills: 5 | Status: SHIPPED | OUTPATIENT
Start: 2021-09-03 | End: 2022-07-27 | Stop reason: SDUPTHER

## 2021-09-03 NOTE — TELEPHONE ENCOUNTER
PCP: Thi Reza MD    Last appt: 4/19/2021  Future Appointments   Date Time Provider Adriel Sofia   9/23/2021  2:00 PM Thi Reza MD [facility] BS AMB       Requested Prescriptions     Pending Prescriptions Disp Refills    budesonide-formoteroL (Symbicort) 80-4.5 mcg/actuation HFAA       Sig: Take 2 Puffs by inhalation two (2) times a day.  albuterol (PROVENTIL HFA, VENTOLIN HFA, PROAIR HFA) 90 mcg/actuation inhaler       Sig: Take 2 Puffs by inhalation every four (4) hours as needed for Wheezing.        Prior labs and Blood pressures:  BP Readings from Last 3 Encounters:   04/19/21 125/72   03/22/21 (!) 149/75   02/03/20 137/89     Lab Results   Component Value Date/Time    Sodium 141 03/26/2021 08:14 AM    Potassium 4.8 03/26/2021 08:14 AM    Chloride 102 03/26/2021 08:14 AM    CO2 26 03/26/2021 08:14 AM    Anion gap 8 02/17/2016 09:17 AM    Glucose 120 (H) 03/26/2021 08:14 AM    BUN 16 03/26/2021 08:14 AM    Creatinine 0.81 03/26/2021 08:14 AM    BUN/Creatinine ratio 20 03/26/2021 08:14 AM    GFR est  03/26/2021 08:14 AM    GFR est non-AA 96 03/26/2021 08:14 AM    Calcium 9.6 03/26/2021 08:14 AM     Lab Results   Component Value Date/Time    Hemoglobin A1c 6.3 (H) 03/26/2021 08:14 AM    Hemoglobin A1c (POC) 7.1 (A) 03/22/2016 10:48 AM     Lab Results   Component Value Date/Time    Cholesterol, total 155 03/26/2021 08:14 AM    HDL Cholesterol 47 03/26/2021 08:14 AM    LDL, calculated 80 03/26/2021 08:14 AM    LDL, calculated 85 03/13/2019 12:38 PM    VLDL, calculated 28 03/26/2021 08:14 AM    VLDL, calculated 36 03/13/2019 12:38 PM    Triglyceride 166 (H) 03/26/2021 08:14 AM     No results found for: VITD3, XQVID2, XQVID3, XQVID, VD3RIA    No results found for: TSH, TSH2, TSH3, TSHP, TSHEXT

## 2021-09-03 NOTE — TELEPHONE ENCOUNTER
#[phone]  Pt states he is out of these inhalers and needs a refill yet today. The Symbicort is gone and the other inhaler is lost.    Pt advised of 48/72 hour turn around on refills, but advised we would do our best due to the type of medication.

## 2021-09-23 ENCOUNTER — OFFICE VISIT (OUTPATIENT)
Dept: INTERNAL MEDICINE CLINIC | Age: 61
End: 2021-09-23
Payer: COMMERCIAL

## 2021-09-23 VITALS
HEART RATE: 78 BPM | WEIGHT: 240 LBS | RESPIRATION RATE: 16 BRPM | TEMPERATURE: 97.2 F | OXYGEN SATURATION: 97 % | BODY MASS INDEX: 32.51 KG/M2 | DIASTOLIC BLOOD PRESSURE: 70 MMHG | SYSTOLIC BLOOD PRESSURE: 110 MMHG | HEIGHT: 72 IN

## 2021-09-23 DIAGNOSIS — F32.9 MAJOR DEPRESSIVE DISORDER, REMISSION STATUS UNSPECIFIED, UNSPECIFIED WHETHER RECURRENT: ICD-10-CM

## 2021-09-23 DIAGNOSIS — E78.5 HYPERLIPIDEMIA, UNSPECIFIED HYPERLIPIDEMIA TYPE: ICD-10-CM

## 2021-09-23 DIAGNOSIS — J44.9 CHRONIC OBSTRUCTIVE PULMONARY DISEASE, UNSPECIFIED COPD TYPE (HCC): ICD-10-CM

## 2021-09-23 DIAGNOSIS — L03.116 CELLULITIS OF LEFT LEG: ICD-10-CM

## 2021-09-23 DIAGNOSIS — I10 HYPERTENSION, UNSPECIFIED TYPE: ICD-10-CM

## 2021-09-23 DIAGNOSIS — E11.9 CONTROLLED TYPE 2 DIABETES MELLITUS WITHOUT COMPLICATION, WITHOUT LONG-TERM CURRENT USE OF INSULIN (HCC): Primary | ICD-10-CM

## 2021-09-23 DIAGNOSIS — Z23 NEEDS FLU SHOT: ICD-10-CM

## 2021-09-23 DIAGNOSIS — Z23 ENCOUNTER FOR IMMUNIZATION: ICD-10-CM

## 2021-09-23 PROCEDURE — 90715 TDAP VACCINE 7 YRS/> IM: CPT | Performed by: INTERNAL MEDICINE

## 2021-09-23 PROCEDURE — 99214 OFFICE O/P EST MOD 30 MIN: CPT | Performed by: INTERNAL MEDICINE

## 2021-09-23 PROCEDURE — 90472 IMMUNIZATION ADMIN EACH ADD: CPT | Performed by: INTERNAL MEDICINE

## 2021-09-23 PROCEDURE — 90471 IMMUNIZATION ADMIN: CPT | Performed by: INTERNAL MEDICINE

## 2021-09-23 PROCEDURE — 90686 IIV4 VACC NO PRSV 0.5 ML IM: CPT | Performed by: INTERNAL MEDICINE

## 2021-09-23 RX ORDER — CEPHALEXIN 500 MG/1
500 CAPSULE ORAL 3 TIMES DAILY
Qty: 30 CAPSULE | Refills: 0 | Status: SHIPPED | OUTPATIENT
Start: 2021-09-23 | End: 2021-10-03

## 2021-09-23 RX ORDER — TRIAMCINOLONE ACETONIDE 1 MG/G
CREAM TOPICAL 2 TIMES DAILY
Qty: 30 G | Refills: 0 | Status: SHIPPED | OUTPATIENT
Start: 2021-09-23 | End: 2021-11-18 | Stop reason: SDUPTHER

## 2021-09-23 RX ORDER — DOXYCYCLINE 100 MG/1
100 TABLET ORAL 2 TIMES DAILY
Qty: 20 TABLET | Refills: 0 | Status: SHIPPED | OUTPATIENT
Start: 2021-09-23

## 2021-09-23 NOTE — PATIENT INSTRUCTIONS
Office Policies    Phone calls/patient messages:            Please allow up to 24 hours for someone in the office to contact you about your call or message. Be mindful your provider may be out of the office or your message may require further review. We encourage you to use [employer] for your messages as this is a faster, more efficient way to communicate with our office                         Medication Refills:            Prescription medications require 48-72 business hours to process. We encourage you to use [employer] for your refills. For controlled medications: Please allow 72 business hours to process. Certain medications may require you to  a written prescription at our office. NO narcotic/controlled medications will be prescribed after 4pm Monday through Friday or on weekends              Form/Paperwork Completion:            Please note a $25 fee may incur for all paperwork for completed by our providers. We ask that you allow 7-10 business days. Pre-payment is due prior to picking up/faxing the completed form. You may also download your forms to [employer] to have your doctor print off.

## 2021-09-23 NOTE — PROGRESS NOTES
Dominique Johnson is a 57 y.o. male who presents for routine immunizations. He denies any symptoms , reactions or allergies that would exclude them from being immunized today. Risks and adverse reactions were discussed and the VIS was given to them. All questions were addressed. He was observed for20 min post injection. There were no reactions observed.     Erin Trinidad LPN

## 2021-09-23 NOTE — PROGRESS NOTES
HISTORY OF PRESENT ILLNESS  Alvin Olguin is a 57 y.o. male. HPI   Patient here today routine f/u diabetes hypertension hyperlipidemia obesity MDD copd-moderate  Last a1c 6.3 LDL 80  fsbs 100-115    Anxiety has been controlled on lexapro  Copd-some difficulty over the summer with breathing      C/o red itchy rash on left lower leg for several days  No f/c  Was in hospital several yrs ago for left leg cellulitis  Last OV       Last a1c 6.3 LDL 85  fsbs avg around 120  No cp sob   Has pain in feet and wants rx for voltaren gel  bp has been high at home 117 range systolic     Found to have and liver nodules at [facility] in [facility] by CT scan  [place] [facility] 2 weeks ago and fractured 3 ribs left upper chest-nondisplaced  Took oxycodone then  Hydrocodone  Pain can be severe with deep breaths but improving         Patient Active Problem List    Diagnosis Date Noted    Pulmonary emphysema ([facility] [address] 21/02/9419    Umbilical hernia 31/45/9558    Diabetes mellitus ([facility] [address] 01/13/2012    Dyslipidemia 06/21/2011    Psoriasis 06/21/2011    Depression 06/21/2011     Current Outpatient Medications   Medication Sig Dispense Refill    cephALEXin (KEFLEX) 500 mg capsule Take 1 Capsule by mouth three (3) times daily for 10 days. 30 Capsule 0    doxycycline (ADOXA) 100 mg tablet Take 1 Tablet by mouth two (2) times a day. 20 Tablet 0    triamcinolone acetonide (KENALOG) 0.1 % topical cream Apply  to affected area two (2) times a day. use thin layer 30 g 0    budesonide-formoteroL (Symbicort) 80-4.5 mcg/actuation HFAA Take 2 Puffs by inhalation two (2) times a day. 1 Each 5    olmesartan (BENICAR) 20 mg tablet Take 1 Tablet by mouth daily. 90 Tablet 1    escitalopram oxalate (LEXAPRO) 20 mg tablet One qd 90 Tablet 3    metFORMIN (GLUCOPHAGE) 500 mg tablet 2 bid 360 Tab 3    diclofenac (VOLTAREN) 1 % gel Apply 2 g to affected area four (4) times daily.  100 g 5    SITagliptin (Januvia) 100 mg tablet TAKE 1 TABLET BY MOUTH ONCE DAILY 90 Tab 3  pravastatin (PRAVACHOL) 20 mg tablet take 1 tablet by mouth once daily 90 Tab 3    glucose blood VI test strips (Accu-Chek Ciera Plus test strp) strip TEST twice a day as directed 50 Strip 11    tamsulosin (FLOMAX) 0.4 mg capsule TAKE 1 CAPSULE BY MOUTH ONCE DAILY 90 Cap 3    ALPRAZolam (XANAX) 0.25 mg tablet take 1 tablet by mouth NIGHTLY if needed for anxiety 30 Tab 1    Cetirizine (ZYRTEC) 10 mg cap Take  by mouth as needed.  albuterol (PROVENTIL HFA, VENTOLIN HFA, PROAIR HFA) 90 mcg/actuation inhaler Take 2 Puffs by inhalation every four (4) hours as needed for Wheezing. 1 Each 5    lidocaine (LIDODERM) 5 % 1 Patch by TransDERmal route every twenty-four (24) hours. Apply patch to the affected area for 12 hours a day and remove for 12 hours a day. (Patient not taking: Reported on 9/23/2021) 30 Each 0    diclofenac EC (VOLTAREN) 50 mg EC tablet Take 1 Tab by mouth two (2) times a day. (Patient not taking: Reported on 9/23/2021) 30 Tab 0    omeprazole (PRILOSEC) 20 mg capsule Take 1 Cap by mouth daily.  (Patient not taking: Reported on 9/23/2021) 20 Cap 0     Allergies   Allergen Reactions    Aspirin Other (comments)     PUD Patient stated that he can take Asprin      Lab Results   Component Value Date/Time    WBC 7.7 03/26/2021 08:14 AM    HGB 13.1 03/26/2021 08:14 AM    HCT 39.8 03/26/2021 08:14 AM    PLATELET 900 38/40/7403 08:14 AM    MCV 85 03/26/2021 08:14 AM     Lab Results   Component Value Date/Time    Hemoglobin A1c 6.3 (H) 03/26/2021 08:14 AM    Hemoglobin A1c 6.3 (H) 03/13/2019 12:38 PM    Hemoglobin A1c 7.2 (H) 10/17/2014 09:47 AM    Glucose 120 (H) 03/26/2021 08:14 AM    Glucose (POC) 135 (H) 06/09/2015 04:56 PM    Microalb/Creat ratio (ug/mg creat.) 8 03/26/2021 08:14 AM    LDL, calculated 80 03/26/2021 08:14 AM    LDL, calculated 85 03/13/2019 12:38 PM    Creatinine 0.81 03/26/2021 08:14 AM      Lab Results   Component Value Date/Time    Cholesterol, total 155 03/26/2021 08:14 AM HDL Cholesterol 47 03/26/2021 08:14 AM    LDL, calculated 80 03/26/2021 08:14 AM    LDL, calculated 85 03/13/2019 12:38 PM    Triglyceride 166 (H) 03/26/2021 08:14 AM     Lab Results   Component Value Date/Time    GFR est non-AA 96 03/26/2021 08:14 AM    GFR est  03/26/2021 08:14 AM    Creatinine 0.81 03/26/2021 08:14 AM    BUN 16 03/26/2021 08:14 AM    Sodium 141 03/26/2021 08:14 AM    Potassium 4.8 03/26/2021 08:14 AM    Chloride 102 03/26/2021 08:14 AM    CO2 26 03/26/2021 08:14 AM        ROS    Physical Exam  Vitals and nursing note reviewed. Constitutional:       General: He is not in acute distress. Appearance: He is well-developed. He is obese. Comments: Appears stated age   HENT:      Head: Normocephalic. Cardiovascular:      Rate and Rhythm: Normal rate and regular rhythm. Heart sounds: Normal heart sounds. Pulmonary:      Effort: Pulmonary effort is normal.      Breath sounds: Normal breath sounds. Abdominal:      Palpations: Abdomen is soft. Skin:     Findings: Erythema and rash present. Comments:      Diabetic foot exam performed by Mendel Constable, MD       Measurement  Response Nurse Comment Physician Comment  Monofilament  R - normal sensation with micro filament  L - normal sensation with micro filament    Pulse DP R - 2+ (normal)  L - 2+ (normal)    Pulse TP R - 2+ (normal)  L - 2+ (normal)    Structural deformity R - None  L - None    Skin Integrity / Deformity R - None  L - None       Reviewed by:         Neurological:      Mental Status: He is alert. ASSESSMENT and PLAN  Diagnoses and all orders for this visit:    1. Controlled type 2 diabetes mellitus without complication, without long-term current use of insulin (HCC)  -     METABOLIC PANEL, COMPREHENSIVE; Future  -     HEMOGLOBIN A1C WITH EAG; Future   Controlled per home readings   Foot exam done today   UTD eye exam per pt  2.  Hypertension, unspecified type  -     METABOLIC PANEL, COMPREHENSIVE; Future   controlled  3. Hyperlipidemia, unspecified hyperlipidemia type  -     METABOLIC PANEL, COMPREHENSIVE; Future  -     LIPID PANEL; Future    4. Chronic obstructive pulmonary disease, unspecified COPD type ([facility] [address]   stable  5. Major depressive disorder, remission status unspecified, unspecified whether recurrent   Not depressed   Anxiety controlled on lexapro  6. Needs flu shot  -     INFLUENZA VIRUS VAC QUAD,SPLIT,PRESV FREE SYRINGE IM    7. Encounter for immunization  -     TETANUS, DIPHTHERIA TOXOIDS AND ACELLULAR PERTUSSIS VACCINE (TDAP), IN INDIVIDS. >=7, IM    8. Cellulitis of left leg   Mild erythema/pink discoloration and warmth medial aspect of LLE   Start keflex and doxy x 10d   To call or return if not resolved--pt verbalized understanding and agreement    Other orders  -     cephALEXin (KEFLEX) 500 mg capsule; Take 1 Capsule by mouth three (3) times daily for 10 days. -     doxycycline (ADOXA) 100 mg tablet; Take 1 Tablet by mouth two (2) times a day. -     triamcinolone acetonide (KENALOG) 0.1 % topical cream; Apply  to affected area two (2) times a day. use thin layer      Follow-up and Dispositions    · Return in about 6 months (around 3/23/2022) for CPE.

## 2021-10-13 RX ORDER — BLOOD SUGAR DIAGNOSTIC
STRIP MISCELLANEOUS
Qty: 200 STRIP | Refills: 3 | Status: SHIPPED | OUTPATIENT
Start: 2021-10-13 | End: 2022-09-25

## 2021-11-18 RX ORDER — TRIAMCINOLONE ACETONIDE 1 MG/G
CREAM TOPICAL 2 TIMES DAILY
Qty: 30 G | Refills: 0 | Status: SHIPPED | OUTPATIENT
Start: 2021-11-18 | End: 2022-04-20

## 2021-11-18 NOTE — TELEPHONE ENCOUNTER
Future Appointments:  No future appointments. Last Appointment With Me:  9/23/2021     Requested Prescriptions     Pending Prescriptions Disp Refills    triamcinolone acetonide (KENALOG) 0.1 % topical cream 30 g 0     Sig: Apply  to affected area two (2) times a day.  use thin layer

## 2022-01-01 RX ORDER — OLMESARTAN MEDOXOMIL 20 MG/1
20 TABLET ORAL DAILY
Qty: 90 TABLET | Refills: 3 | Status: SHIPPED | OUTPATIENT
Start: 2022-01-01 | End: 2022-07-14 | Stop reason: ALTCHOICE

## 2022-01-05 DIAGNOSIS — N40.1 BENIGN PROSTATIC HYPERPLASIA WITH WEAK URINARY STREAM: ICD-10-CM

## 2022-01-05 DIAGNOSIS — R39.12 BENIGN PROSTATIC HYPERPLASIA WITH WEAK URINARY STREAM: ICD-10-CM

## 2022-01-06 RX ORDER — TAMSULOSIN HYDROCHLORIDE 0.4 MG/1
CAPSULE ORAL
Qty: 90 CAPSULE | Refills: 3 | Status: SHIPPED | OUTPATIENT
Start: 2022-01-06

## 2022-01-07 RX ORDER — PRAVASTATIN SODIUM 20 MG/1
TABLET ORAL
Qty: 90 TABLET | Refills: 3 | Status: SHIPPED | OUTPATIENT
Start: 2022-01-07

## 2022-02-01 NOTE — TELEPHONE ENCOUNTER
PCP: Joellen Loza MD    Last appt: 2021    Future Appointments   Date Time Provider Adriel Sofia   2021  2:00 PM Joellen Loza MD [facility] BS AMB       Requested Prescriptions     Pending Prescriptions Disp Refills    metFORMIN (GLUCOPHAGE) 500 mg tablet 360 Tab 3     Si bid       Prior labs and Blood pressures:  BP Readings from Last 3 Encounters:   21 125/72   21 (!) 149/75   20 137/89     Lab Results   Component Value Date/Time    Sodium 141 2021 08:14 AM    Potassium 4.8 2021 08:14 AM    Chloride 102 2021 08:14 AM    CO2 26 2021 08:14 AM    Anion gap 8 2016 09:17 AM    Glucose 120 (H) 2021 08:14 AM    BUN 16 2021 08:14 AM    Creatinine 0.81 2021 08:14 AM    BUN/Creatinine ratio 20 2021 08:14 AM    GFR est  2021 08:14 AM    GFR est non-AA 96 2021 08:14 AM    Calcium 9.6 2021 08:14 AM     Lab Results   Component Value Date/Time    Hemoglobin A1c 6.3 (H) 2021 08:14 AM    Hemoglobin A1c (POC) 7.1 (A) 2016 10:48 AM     Lab Results   Component Value Date/Time    Cholesterol, total 155 2021 08:14 AM    HDL Cholesterol 47 2021 08:14 AM    LDL, calculated 80 2021 08:14 AM    LDL, calculated 85 2019 12:38 PM    VLDL, calculated 28 2021 08:14 AM    VLDL, calculated 36 2019 12:38 PM    Triglyceride 166 (H) 2021 08:14 AM     No results found for: VITD3, XQVID2, XQVID3, XQVID, VD3RIA    No results found for: TSH, TSH2, TSH3, TSHP, TSHEXT Sent to [facility]

## 2022-03-19 PROBLEM — J43.9 PULMONARY EMPHYSEMA (HCC): Status: ACTIVE | Noted: 2019-09-12

## 2022-04-20 RX ORDER — TRIAMCINOLONE ACETONIDE 1 MG/G
CREAM TOPICAL
Qty: 30 G | Refills: 0 | Status: SHIPPED | OUTPATIENT
Start: 2022-04-20

## 2022-05-24 RX ORDER — METFORMIN HYDROCHLORIDE 500 MG/1
TABLET ORAL
Qty: 360 TABLET | Refills: 3 | Status: SHIPPED | OUTPATIENT
Start: 2022-05-24

## 2022-07-13 NOTE — PROGRESS NOTES
HISTORY OF PRESENT ILLNESS  Shelby Alberto is a 57 y.o. male. HPI   Hx  diabetes hypertension hyperlipidemia obesity MDD/anxiety copd-moderate  ER FU  Went toER at [facility] for hematuria and left flank pain-U cx-no growth but was started on bactrim. U A tr wbc, positive blood  CT-large 9 cm renal cyst /hypodensity and prominent liver with multople small hypodensities  Renal US recommended and possible liver MRI to characterize    went back to ER 7/9 for dysuria--after completing bactrim  UA -0- wbc, > 100 rbc--culture negative but was started on levaquin 500mg every day x 10d  A few days ago he passed stone and clot at home-feels well now and no recurrent hematuria per pt      bp has been low at home last weekend so stayed off his bp medication this week  [facility] with [facility] and may need cysto    Former smoker    requesst med for ED    Patient Active Problem List    Diagnosis Date Noted    Pulmonary emphysema ([facility] [address] 63/71/9146    Umbilical hernia 92/64/2106    Diabetes mellitus ([facility] [address] 01/13/2012    Dyslipidemia 06/21/2011    Psoriasis 06/21/2011    Depression 06/21/2011     Current Outpatient Medications   Medication Sig Dispense Refill    metFORMIN (GLUCOPHAGE) 500 mg tablet TAKE 2 TABLETS BY MOUTH  TWICE DAILY 360 Tablet 3    triamcinolone acetonide (KENALOG) 0.1 % topical cream APPLY A THIN LAYER TO THE AFFECTED AREA TWICE DAILY 30 g 0    SITagliptin (Januvia) 100 mg tablet TAKE 1 TABLET BY MOUTH ONCE DAILY 90 Tablet 3    pravastatin (PRAVACHOL) 20 mg tablet TAKE 1 TABLET BY MOUTH ONCE DAILY 90 Tablet 3    tamsulosin (FLOMAX) 0.4 mg capsule TAKE 1 CAPSULE BY MOUTH  ONCE DAILY 90 Capsule 3    olmesartan (BENICAR) 20 mg tablet TAKE 1 TABLET BY MOUTH  DAILY 90 Tablet 3    glucose blood VI test strips (Accu-Chek Ciera Plus test strp) strip TEST TWICE DAILY AS  DIRECTED 200 Strip 3    doxycycline (ADOXA) 100 mg tablet Take 1 Tablet by mouth two (2) times a day.  20 Tablet 0    budesonide-formoteroL (Symbicort) 80-4.5 mcg/actuation HFAA Take 2 Puffs by inhalation two (2) times a day. 1 Each 5    albuterol (PROVENTIL HFA, VENTOLIN HFA, PROAIR HFA) 90 mcg/actuation inhaler Take 2 Puffs by inhalation every four (4) hours as needed for Wheezing. 1 Each 5    escitalopram oxalate (LEXAPRO) 20 mg tablet One qd 90 Tablet 3    diclofenac (VOLTAREN) 1 % gel Apply 2 g to affected area four (4) times daily. 100 g 5    lidocaine (LIDODERM) 5 % 1 Patch by TransDERmal route every twenty-four (24) hours. Apply patch to the affected area for 12 hours a day and remove for 12 hours a day. (Patient not taking: Reported on 9/23/2021) 30 Each 0    diclofenac EC (VOLTAREN) 50 mg EC tablet Take 1 Tab by mouth two (2) times a day. (Patient not taking: Reported on 9/23/2021) 30 Tab 0    ALPRAZolam (XANAX) 0.25 mg tablet take 1 tablet by mouth NIGHTLY if needed for anxiety 30 Tab 1    Cetirizine (ZYRTEC) 10 mg cap Take  by mouth as needed.  omeprazole (PRILOSEC) 20 mg capsule Take 1 Cap by mouth daily.  (Patient not taking: Reported on 9/23/2021) 20 Cap 0     Allergies   Allergen Reactions    Aspirin Other (comments)     PUD Patient stated that he can take Asprin      Lab Results   Component Value Date/Time    WBC 7.7 03/26/2021 08:14 AM    HGB 13.1 03/26/2021 08:14 AM    HCT 39.8 03/26/2021 08:14 AM    PLATELET 242 40/43/1431 08:14 AM    MCV 85 03/26/2021 08:14 AM     Lab Results   Component Value Date/Time    Hemoglobin A1c 6.3 (H) 03/26/2021 08:14 AM    Hemoglobin A1c 6.3 (H) 03/13/2019 12:38 PM    Hemoglobin A1c 7.2 (H) 10/17/2014 09:47 AM    Glucose 120 (H) 03/26/2021 08:14 AM    Glucose (POC) 135 (H) 06/09/2015 04:56 PM    Microalb/Creat ratio (ug/mg creat.) 8 03/26/2021 08:14 AM    LDL, calculated 80 03/26/2021 08:14 AM    LDL, calculated 85 03/13/2019 12:38 PM    Creatinine 0.81 03/26/2021 08:14 AM      Lab Results   Component Value Date/Time    Cholesterol, total 155 03/26/2021 08:14 AM    HDL Cholesterol 47 03/26/2021 08:14 AM    LDL, calculated 80 03/26/2021 08:14 AM    LDL, calculated 85 03/13/2019 12:38 PM    Triglyceride 166 (H) 03/26/2021 08:14 AM     Lab Results   Component Value Date/Time    GFR est non-AA 96 03/26/2021 08:14 AM    GFR est  03/26/2021 08:14 AM    Creatinine 0.81 03/26/2021 08:14 AM    BUN 16 03/26/2021 08:14 AM    Sodium 141 03/26/2021 08:14 AM    Potassium 4.8 03/26/2021 08:14 AM    Chloride 102 03/26/2021 08:14 AM    CO2 26 03/26/2021 08:14 AM        Review of Systems   Constitutional: Negative for chills, fever, malaise/fatigue and weight loss. Eyes: Negative for blurred vision and double vision. Respiratory: Negative for cough and shortness of breath. Cardiovascular: Negative for chest pain and palpitations. Gastrointestinal: Negative for abdominal pain, blood in stool, constipation, diarrhea, melena, nausea and vomiting. Genitourinary: Negative for dysuria, frequency, hematuria and urgency. Musculoskeletal: Negative for back pain, falls, joint pain and myalgias. Neurological: Negative for dizziness, tremors and headaches. Physical Exam  Vitals and nursing note reviewed. Constitutional:       General: He is not in acute distress. Appearance: He is well-developed. Comments: Appears stated age   HENT:      Head: Normocephalic. Cardiovascular:      Rate and Rhythm: Normal rate and regular rhythm. Heart sounds: Normal heart sounds. Pulmonary:      Effort: Pulmonary effort is normal.      Breath sounds: Normal breath sounds. Abdominal:      Palpations: Abdomen is soft. Neurological:      Mental Status: He is alert. ASSESSMENT and PLAN  Diagnoses and all orders for this visit:    1. Hypertension, unspecified type   Controlled off medication   Dc benicar   Home bp monitoring-restart bp med if > 140/90 d/w pt  2. Chronic obstructive pulmonary disease, unspecified COPD type ([facility] [address]    3.  Controlled type 2 diabetes mellitus without complication, without long-term current use of insulin (HCC)  -     HEMOGLOBIN A1C WITH EAG; Future  -     MICROALBUMIN, UR, RAND W/ MICROALB/CREAT RATIO; Future    4. Pure hypercholesterolemia  -     LIPID PANEL; Future    5. Pericardial effusion  -     REFERRAL TO CARDIOLOGY    6. Lesion of liver  -     REFERRAL TO GASTROENTEROLOGY    7. Prostate cancer (HCC)  -     PSA W/ REFLX FREE PSA; Future    8. Erectile dysfunction, unspecified erectile dysfunction type   rx cialis    9. Hematuria   cx negative   CT and cysto at [place] [facility]  Other orders  -     tadalafiL (Cialis) 20 mg tablet; Take 1 Tablet by mouth daily as needed for Erectile Dysfunction. Follow-up and Dispositions    · Return in about 6 months (around 1/14/2023) for CPE.

## 2022-07-14 ENCOUNTER — OFFICE VISIT (OUTPATIENT)
Dept: INTERNAL MEDICINE CLINIC | Age: 62
End: 2022-07-14
Payer: COMMERCIAL

## 2022-07-14 VITALS
HEIGHT: 72 IN | OXYGEN SATURATION: 97 % | TEMPERATURE: 97.4 F | WEIGHT: 230 LBS | HEART RATE: 81 BPM | SYSTOLIC BLOOD PRESSURE: 110 MMHG | DIASTOLIC BLOOD PRESSURE: 68 MMHG | BODY MASS INDEX: 31.15 KG/M2 | RESPIRATION RATE: 16 BRPM

## 2022-07-14 DIAGNOSIS — K76.9 LESION OF LIVER: ICD-10-CM

## 2022-07-14 DIAGNOSIS — N52.9 ERECTILE DYSFUNCTION, UNSPECIFIED ERECTILE DYSFUNCTION TYPE: ICD-10-CM

## 2022-07-14 DIAGNOSIS — J44.9 CHRONIC OBSTRUCTIVE PULMONARY DISEASE, UNSPECIFIED COPD TYPE (HCC): ICD-10-CM

## 2022-07-14 DIAGNOSIS — C61 PROSTATE CANCER (HCC): ICD-10-CM

## 2022-07-14 DIAGNOSIS — E78.00 PURE HYPERCHOLESTEROLEMIA: ICD-10-CM

## 2022-07-14 DIAGNOSIS — R31.9 HEMATURIA, UNSPECIFIED TYPE: ICD-10-CM

## 2022-07-14 DIAGNOSIS — E11.9 CONTROLLED TYPE 2 DIABETES MELLITUS WITHOUT COMPLICATION, WITHOUT LONG-TERM CURRENT USE OF INSULIN (HCC): ICD-10-CM

## 2022-07-14 DIAGNOSIS — I31.39 PERICARDIAL EFFUSION: ICD-10-CM

## 2022-07-14 DIAGNOSIS — I10 HYPERTENSION, UNSPECIFIED TYPE: Primary | ICD-10-CM

## 2022-07-14 LAB
CHOLEST SERPL-MCNC: 134 MG/DL
CREAT UR-MCNC: 149 MG/DL
EST. AVERAGE GLUCOSE BLD GHB EST-MCNC: 123 MG/DL
HBA1C MFR BLD: 5.9 % (ref 4–5.6)
HDLC SERPL-MCNC: 52 MG/DL
HDLC SERPL: 2.6 {RATIO} (ref 0–5)
LDLC SERPL CALC-MCNC: 60.8 MG/DL (ref 0–100)
MICROALBUMIN UR-MCNC: 2.93 MG/DL
MICROALBUMIN/CREAT UR-RTO: 20 MG/G (ref 0–30)
TRIGL SERPL-MCNC: 106 MG/DL (ref ?–150)
VLDLC SERPL CALC-MCNC: 21.2 MG/DL

## 2022-07-14 PROCEDURE — 99214 OFFICE O/P EST MOD 30 MIN: CPT | Performed by: INTERNAL MEDICINE

## 2022-07-14 RX ORDER — TADALAFIL 20 MG/1
20 TABLET ORAL
Qty: 6 TABLET | Refills: 5 | Status: SHIPPED | OUTPATIENT
Start: 2022-07-14

## 2022-07-14 RX ORDER — LEVOFLOXACIN 500 MG/1
TABLET, FILM COATED ORAL DAILY
COMMUNITY

## 2022-07-14 NOTE — PROGRESS NOTES
1. \"Have you been to the ER, urgent care clinic since your last visit? Hospitalized since your last visit? \" yes, ED visits @ [facility]    2. \"Have you seen or consulted any other health care providers outside of the [address] [place] [address] since your last visit? \" Yes, [facility]    3. For patients aged 39-70: Has the patient had a colonoscopy / FIT/ Cologuard?  Next exam due 2024 noted in chart

## 2022-07-14 NOTE — PATIENT INSTRUCTIONS
Office Policies    Phone calls/patient messages:            Please allow up to 24 hours for someone in the office to contact you about your call or message. Be mindful your provider may be out of the office or your message may require further review. We encourage you to use [employer] for your messages as this is a faster, more efficient way to communicate with our office                         Medication Refills:            Prescription medications require 48-72 business hours to process. We encourage you to use [employer] for your refills. For controlled medications: Please allow 72 business hours to process. Certain medications may require you to  a written prescription at our office. NO narcotic/controlled medications will be prescribed after 4pm Monday through Friday or on weekends              Form/Paperwork Completion:            Please note a $25 fee may incur for all paperwork for completed by our providers. We ask that you allow 7-10 business days. Pre-payment is due prior to picking up/faxing the completed form. You may also download your forms to [employer] to have your doctor print off.

## 2022-07-15 LAB
PSA SERPL-MCNC: 0.7 NG/ML (ref 0–4)
REFLEX CRITERIA: NORMAL

## 2022-07-27 RX ORDER — BUDESONIDE AND FORMOTEROL FUMARATE DIHYDRATE 80; 4.5 UG/1; UG/1
2 AEROSOL RESPIRATORY (INHALATION) 2 TIMES DAILY
Qty: 1 EACH | Refills: 5 | Status: SHIPPED | OUTPATIENT
Start: 2022-07-27

## 2022-07-27 NOTE — TELEPHONE ENCOUNTER
Future Appointments:  Future Appointments   Date Time Provider Adriel Alcantari   1/16/2023  3:00 PM Sheba Blanca MD [facility] BS AMB        Last Appointment With Me:  7/14/2022     Requested Prescriptions     Pending Prescriptions Disp Refills    budesonide-formoteroL (Symbicort) 80-4.5 mcg/actuation HFAA 1 Each 5     Sig: Take 2 Puffs by inhalation two (2) times a day.

## 2022-08-03 RX ORDER — ESCITALOPRAM OXALATE 20 MG/1
TABLET ORAL
Qty: 90 TABLET | Refills: 3 | Status: SHIPPED | OUTPATIENT
Start: 2022-08-03

## 2022-09-25 RX ORDER — BLOOD SUGAR DIAGNOSTIC
STRIP MISCELLANEOUS
Qty: 200 STRIP | Refills: 3 | Status: SHIPPED | OUTPATIENT
Start: 2022-09-25

## 2022-10-31 RX ORDER — OLMESARTAN MEDOXOMIL 20 MG/1
20 TABLET ORAL DAILY
Qty: 90 TABLET | Refills: 3 | Status: SHIPPED | OUTPATIENT
Start: 2022-10-31

## 2022-11-11 ENCOUNTER — CLINICAL SUPPORT (OUTPATIENT)
Dept: INTERNAL MEDICINE CLINIC | Age: 62
End: 2022-11-11
Payer: COMMERCIAL

## 2022-11-11 VITALS — DIASTOLIC BLOOD PRESSURE: 68 MMHG | TEMPERATURE: 97.9 F | SYSTOLIC BLOOD PRESSURE: 110 MMHG

## 2022-11-11 DIAGNOSIS — Z23 NEEDS FLU SHOT: Primary | ICD-10-CM

## 2022-11-11 PROCEDURE — 90471 IMMUNIZATION ADMIN: CPT | Performed by: INTERNAL MEDICINE

## 2022-11-11 PROCEDURE — 90686 IIV4 VACC NO PRSV 0.5 ML IM: CPT | Performed by: INTERNAL MEDICINE

## 2022-12-07 NOTE — TELEPHONE ENCOUNTER
PCP: Cheo Mckay MD    Last appt: 2/3/2020  No future appointments.     Requested Prescriptions     Pending Prescriptions Disp Refills    SITagliptin (Januvia) 100 mg tablet 90 Tab 3     Sig: take 1 tablet by mouth once daily    glucose blood VI test strips (Accu-Chek Ciera Plus test strp) strip 50 Strip 11     Sig: TEST twice a day as directed Detail Level: Detailed

## 2022-12-08 DIAGNOSIS — R39.12 BENIGN PROSTATIC HYPERPLASIA WITH WEAK URINARY STREAM: ICD-10-CM

## 2022-12-08 DIAGNOSIS — N40.1 BENIGN PROSTATIC HYPERPLASIA WITH WEAK URINARY STREAM: ICD-10-CM

## 2022-12-09 RX ORDER — TAMSULOSIN HYDROCHLORIDE 0.4 MG/1
CAPSULE ORAL
Qty: 90 CAPSULE | Refills: 3 | Status: SHIPPED | OUTPATIENT
Start: 2022-12-09

## 2022-12-12 RX ORDER — PRAVASTATIN SODIUM 20 MG/1
TABLET ORAL
Qty: 90 TABLET | Refills: 3 | Status: SHIPPED | OUTPATIENT
Start: 2022-12-12

## 2023-01-15 NOTE — PROGRESS NOTES
HISTORY OF PRESENT ILLNESS  Omar Mistry is a 60 y.o. male. HPI  FU  diabetes hypertension hyperlipidemia obesity MDD/anxiety copd-moderate psorasis ,and CPE  Last a1c 5.9 LDL 60  Depression/anxiety on lexapro-controlled  Fsbs around 120   Quit tobacco 1998-no sob walking   Some DELVALLE with heavy work   Walks a lot at work and during the day  Rare etoh  Request refill of steroid crms for psoriasis of elbow and knees-not interested in oral medication  C/o some right knee pain around knee cap a few times per day which improves with knee extensions  ER FU  [place] Drop toER at [facility] for hematuria and left flank pain-U cx-no growth but was started on bactrim. U A tr wbc, positive blood  CT-large 9 cm renal cyst /hypodensity and prominent liver with multople small hypodensities  Renal US recommended and possible liver MRI to characterize     went back to ER 7/9 for dysuria--after completing bactrim  UA -0- wbc, > 100 rbc--culture negative but was started on levaquin 500mg every day x 10d  A few days ago he passed stone and clot at home-feels well now and no recurrent hematuria per pt        bp has been low at home last weekend so stayed off his bp medication this week  [facility] with contrast ordered and may need cysto       Patient Active Problem List    Diagnosis Date Noted    Pulmonary emphysema ([facility] [address] 56/69/0223    Umbilical hernia 18/13/6958    Diabetes mellitus ([facility] [address] 01/13/2012    Dyslipidemia 06/21/2011    Psoriasis 06/21/2011    Depression 06/21/2011     Current Outpatient Medications   Medication Sig Dispense Refill    clobetasoL (TEMOVATE) 0.05 % topical cream Apply  to affected area two (2) times a day.  60 g 5    triamcinolone acetonide (KENALOG) 0.1 % topical cream APPLY A THIN LAYER TO THE AFFECTED AREA TWICE DAILY 60 g 5    pravastatin (PRAVACHOL) 20 mg tablet TAKE 1 TABLET BY MOUTH ONCE DAILY 90 Tablet 3    SITagliptin phosphate (Januvia) 100 mg tablet TAKE 1 TABLET BY MOUTH ONCE DAILY 90 Tablet 3    tamsulosin (FLOMAX) 0.4 mg capsule TAKE 1 CAPSULE BY MOUTH  ONCE DAILY 90 Capsule 3    Accu-Chek Ciera Plus test strp strip TEST TWICE DAILY AS  DIRECTED 200 Strip 3    escitalopram oxalate (LEXAPRO) 20 mg tablet TAKE 1 TABLET BY MOUTH  DAILY 90 Tablet 3    budesonide-formoteroL (Symbicort) 80-4.5 mcg/actuation HFAA Take 2 Puffs by inhalation two (2) times a day. 1 Each 5    tadalafiL (Cialis) 20 mg tablet Take 1 Tablet by mouth daily as needed for Erectile Dysfunction. 6 Tablet 5    metFORMIN (GLUCOPHAGE) 500 mg tablet TAKE 2 TABLETS BY MOUTH  TWICE DAILY 360 Tablet 3    albuterol (PROVENTIL HFA, VENTOLIN HFA, PROAIR HFA) 90 mcg/actuation inhaler Take 2 Puffs by inhalation every four (4) hours as needed for Wheezing. 1 Each 5    diclofenac EC (VOLTAREN) 50 mg EC tablet Take 1 Tab by mouth two (2) times a day. 30 Tab 0    Cetirizine 10 mg cap Take  by mouth as needed. olmesartan (BENICAR) 20 mg tablet TAKE 1 TABLET BY MOUTH  DAILY (Patient not taking: Reported on 1/16/2023) 90 Tablet 3    omeprazole (PRILOSEC) 20 mg capsule Take 1 Cap by mouth daily.  (Patient not taking: No sig reported) 20 Cap 0     No Active Allergies   Lab Results   Component Value Date/Time    WBC 7.7 03/26/2021 08:14 AM    HGB 13.1 03/26/2021 08:14 AM    HCT 39.8 03/26/2021 08:14 AM    PLATELET 182 35/63/0853 08:14 AM    MCV 85 03/26/2021 08:14 AM     Lab Results   Component Value Date/Time    Hemoglobin A1c 5.9 (H) 07/14/2022 09:51 AM    Hemoglobin A1c 6.3 (H) 03/26/2021 08:14 AM    Hemoglobin A1c 6.3 (H) 03/13/2019 12:38 PM    Glucose 120 (H) 03/26/2021 08:14 AM    Glucose (POC) 135 (H) 06/09/2015 04:56 PM    Microalbumin/Creat ratio (mg/g creat) 20 07/14/2022 09:51 AM    Microalbumin,urine random 2.93 07/14/2022 09:51 AM    LDL, calculated 60.8 07/14/2022 09:51 AM    Creatinine 0.81 03/26/2021 08:14 AM      Lab Results   Component Value Date/Time    Cholesterol, total 134 07/14/2022 09:51 AM    HDL Cholesterol 52 07/14/2022 09:51 AM    LDL, calculated 60.8 07/14/2022 09:51 AM    Triglyceride 106 07/14/2022 09:51 AM    CHOL/HDL Ratio 2.6 07/14/2022 09:51 AM     Lab Results   Component Value Date/Time    GFR est non-AA 96 03/26/2021 08:14 AM    GFR est  03/26/2021 08:14 AM    Creatinine 0.81 03/26/2021 08:14 AM    BUN 16 03/26/2021 08:14 AM    Sodium 141 03/26/2021 08:14 AM    Potassium 4.8 03/26/2021 08:14 AM    Chloride 102 03/26/2021 08:14 AM    CO2 26 03/26/2021 08:14 AM     Lab Results   Component Value Date/Time    Prostate Specific Ag 0.7 07/14/2022 09:51 AM    Prostate Specific Ag 0.5 03/26/2021 08:14 AM    Prostate Specific Ag 0.8 03/13/2019 12:38 PM        ROS    Physical Exam  Vitals and nursing note reviewed. Constitutional:       General: He is not in acute distress. Appearance: Normal appearance. He is well-developed. Comments: Appears stated age   HENT:      Head: Normocephalic and atraumatic. Right Ear: Tympanic membrane normal.      Left Ear: Tympanic membrane normal.      Nose: Nose normal.      Mouth/Throat:      Mouth: Mucous membranes are moist.   Eyes:      Pupils: Pupils are equal, round, and reactive to light. Neck:      Vascular: No carotid bruit. Cardiovascular:      Rate and Rhythm: Normal rate and regular rhythm. Pulses: Normal pulses. Heart sounds: Normal heart sounds. No murmur heard. No friction rub. No gallop. Pulmonary:      Effort: Pulmonary effort is normal.      Breath sounds: Normal breath sounds. Abdominal:      General: Bowel sounds are normal.      Palpations: Abdomen is soft. Musculoskeletal:      Cervical back: Neck supple. Right lower leg: No edema. Left lower leg: No edema. Lymphadenopathy:      Cervical: No cervical adenopathy. Skin:     General: Skin is warm. Comments: /white Silver  lesions on elbows and knees   Neurological:      General: No focal deficit present. Mental Status: He is alert.    Psychiatric:         Mood and Affect: Mood normal.         Thought Content: Thought content normal.         Judgment: Judgment normal.       ASSESSMENT and PLAN    ICD-10-CM ICD-9-CM    1. Hypertension, unspecified type  I10 401.9 controlled      2. Chronic obstructive pulmonary disease, unspecified COPD type ([facility] [address]  J44.9 496 Controlled on symbicort      3. BPH   Controlled on flomax      4. Controlled type 2 diabetes mellitus without complication, without long-term current use of insulin (HCC)  E11.9 250.00 HEMOGLOBIN A1C WITH EAG  Controlled by [employer]      HEMOGLOBIN A1C WITH EAG      5. Hyperlipidemia, unspecified hyperlipidemia type  E78.5 272.4       6. Anxiety  F41.9 300.00 controlled      7. Routine general medical examination at a health care facility  Z00.00 V70.0 CBC W/O DIFF      METABOLIC PANEL, COMPREHENSIVE      LIPID PANEL      TSH 3RD GENERATION      TSH 3RD GENERATION      LIPID PANEL      METABOLIC PANEL, COMPREHENSIVE      CBC W/O DIFF  Shingrix 1/2 today  Utd covid boosters      8. Pulmonary emphysema, unspecified emphysema type ([address]  J43.9 492.8 Continue inhaler      9. Encounter for immunization  Z23 V03.89 ZOSTER, SHINGRIX, (18 YRS +), IM      10. Chronic pain of right knee  M25.561 719.46 REFERRAL TO ORTHOPEDICS  ?  Chondromalacia patella    G89.29 338.29    Rtc 6 months fu and 2nd shingrix

## 2023-01-16 ENCOUNTER — OFFICE VISIT (OUTPATIENT)
Dept: INTERNAL MEDICINE CLINIC | Age: 63
End: 2023-01-16
Payer: COMMERCIAL

## 2023-01-16 VITALS
HEART RATE: 73 BPM | WEIGHT: 232.2 LBS | BODY MASS INDEX: 31.45 KG/M2 | SYSTOLIC BLOOD PRESSURE: 120 MMHG | HEIGHT: 72 IN | TEMPERATURE: 97.2 F | OXYGEN SATURATION: 95 % | DIASTOLIC BLOOD PRESSURE: 78 MMHG | RESPIRATION RATE: 16 BRPM

## 2023-01-16 DIAGNOSIS — J44.9 CHRONIC OBSTRUCTIVE PULMONARY DISEASE, UNSPECIFIED COPD TYPE (HCC): ICD-10-CM

## 2023-01-16 DIAGNOSIS — M25.561 CHRONIC PAIN OF RIGHT KNEE: ICD-10-CM

## 2023-01-16 DIAGNOSIS — E11.9 CONTROLLED TYPE 2 DIABETES MELLITUS WITHOUT COMPLICATION, WITHOUT LONG-TERM CURRENT USE OF INSULIN (HCC): ICD-10-CM

## 2023-01-16 DIAGNOSIS — I10 HYPERTENSION, UNSPECIFIED TYPE: Primary | ICD-10-CM

## 2023-01-16 DIAGNOSIS — J43.9 PULMONARY EMPHYSEMA, UNSPECIFIED EMPHYSEMA TYPE (HCC): ICD-10-CM

## 2023-01-16 DIAGNOSIS — Z00.00 ROUTINE GENERAL MEDICAL EXAMINATION AT A HEALTH CARE FACILITY: ICD-10-CM

## 2023-01-16 DIAGNOSIS — F41.9 ANXIETY: ICD-10-CM

## 2023-01-16 DIAGNOSIS — Z23 ENCOUNTER FOR IMMUNIZATION: ICD-10-CM

## 2023-01-16 DIAGNOSIS — N40.0 BENIGN PROSTATIC HYPERPLASIA, UNSPECIFIED WHETHER LOWER URINARY TRACT SYMPTOMS PRESENT: ICD-10-CM

## 2023-01-16 DIAGNOSIS — G89.29 CHRONIC PAIN OF RIGHT KNEE: ICD-10-CM

## 2023-01-16 DIAGNOSIS — E78.5 HYPERLIPIDEMIA, UNSPECIFIED HYPERLIPIDEMIA TYPE: ICD-10-CM

## 2023-01-16 PROCEDURE — 90750 HZV VACC RECOMBINANT IM: CPT | Performed by: INTERNAL MEDICINE

## 2023-01-16 PROCEDURE — 99396 PREV VISIT EST AGE 40-64: CPT | Performed by: INTERNAL MEDICINE

## 2023-01-16 RX ORDER — TRIAMCINOLONE ACETONIDE 1 MG/G
CREAM TOPICAL
Qty: 60 G | Refills: 5 | Status: SHIPPED | OUTPATIENT
Start: 2023-01-16

## 2023-01-16 RX ORDER — CLOBETASOL PROPIONATE 0.5 MG/G
CREAM TOPICAL 2 TIMES DAILY
Qty: 60 G | Refills: 5 | Status: SHIPPED | OUTPATIENT
Start: 2023-01-16

## 2023-01-16 NOTE — PROGRESS NOTES
1. Have you been to the ER, urgent care clinic since your last visit? Hospitalized since your last visit? No    2. Have you seen or consulted any other health care providers outside of the [address] [place] [address] since your last visit? Include any pap smears or colon screening.  No

## 2023-01-17 LAB
ALBUMIN SERPL-MCNC: 3.8 G/DL (ref 3.5–5)
ALBUMIN/GLOB SERPL: 1.2 (ref 1.1–2.2)
ALP SERPL-CCNC: 62 U/L (ref 45–117)
ALT SERPL-CCNC: 24 U/L (ref 12–78)
ANION GAP SERPL CALC-SCNC: 5 MMOL/L (ref 5–15)
AST SERPL-CCNC: 14 U/L (ref 15–37)
BILIRUB SERPL-MCNC: 0.3 MG/DL (ref 0.2–1)
BUN SERPL-MCNC: 14 MG/DL (ref 6–20)
BUN/CREAT SERPL: 14 (ref 12–20)
CALCIUM SERPL-MCNC: 9.6 MG/DL (ref 8.5–10.1)
CHLORIDE SERPL-SCNC: 106 MMOL/L (ref 97–108)
CHOLEST SERPL-MCNC: 171 MG/DL
CO2 SERPL-SCNC: 31 MMOL/L (ref 21–32)
CREAT SERPL-MCNC: 1 MG/DL (ref 0.7–1.3)
ERYTHROCYTE [DISTWIDTH] IN BLOOD BY AUTOMATED COUNT: 13.4 % (ref 11.5–14.5)
EST. AVERAGE GLUCOSE BLD GHB EST-MCNC: 126 MG/DL
GLOBULIN SER CALC-MCNC: 3.3 G/DL (ref 2–4)
GLUCOSE SERPL-MCNC: 107 MG/DL (ref 65–100)
HBA1C MFR BLD: 6 % (ref 4–5.6)
HCT VFR BLD AUTO: 40.6 % (ref 36.6–50.3)
HDLC SERPL-MCNC: 50 MG/DL
HDLC SERPL: 3.4 (ref 0–5)
HGB BLD-MCNC: 12.3 G/DL (ref 12.1–17)
LDLC SERPL CALC-MCNC: 96.2 MG/DL (ref 0–100)
MCH RBC QN AUTO: 26.9 PG (ref 26–34)
MCHC RBC AUTO-ENTMCNC: 30.3 G/DL (ref 30–36.5)
MCV RBC AUTO: 88.6 FL (ref 80–99)
NRBC # BLD: 0 K/UL (ref 0–0.01)
NRBC BLD-RTO: 0 PER 100 WBC
PLATELET # BLD AUTO: 208 K/UL (ref 150–400)
PMV BLD AUTO: 10.4 FL (ref 8.9–12.9)
POTASSIUM SERPL-SCNC: 4.6 MMOL/L (ref 3.5–5.1)
PROT SERPL-MCNC: 7.1 G/DL (ref 6.4–8.2)
RBC # BLD AUTO: 4.58 M/UL (ref 4.1–5.7)
SODIUM SERPL-SCNC: 142 MMOL/L (ref 136–145)
TRIGL SERPL-MCNC: 124 MG/DL (ref ?–150)
TSH SERPL DL<=0.05 MIU/L-ACNC: 0.43 UIU/ML (ref 0.36–3.74)
VLDLC SERPL CALC-MCNC: 24.8 MG/DL
WBC # BLD AUTO: 6.7 K/UL (ref 4.1–11.1)

## 2023-01-23 DIAGNOSIS — M25.561 RIGHT KNEE PAIN, UNSPECIFIED CHRONICITY: Primary | ICD-10-CM

## 2023-01-31 ENCOUNTER — HOSPITAL ENCOUNTER (OUTPATIENT)
Dept: GENERAL RADIOLOGY | Age: 63
Discharge: HOME | End: 2023-01-31
Payer: COMMERCIAL

## 2023-01-31 ENCOUNTER — OFFICE VISIT (OUTPATIENT)
Dept: ORTHOPEDIC SURGERY | Age: 63
End: 2023-01-31
Payer: COMMERCIAL

## 2023-01-31 VITALS
WEIGHT: 230 LBS | DIASTOLIC BLOOD PRESSURE: 79 MMHG | SYSTOLIC BLOOD PRESSURE: 140 MMHG | BODY MASS INDEX: 31.15 KG/M2 | TEMPERATURE: 97.5 F | OXYGEN SATURATION: 97 % | HEIGHT: 72 IN | HEART RATE: 70 BPM

## 2023-01-31 DIAGNOSIS — M25.561 CHRONIC PAIN OF RIGHT KNEE: Primary | ICD-10-CM

## 2023-01-31 DIAGNOSIS — G89.29 CHRONIC PAIN OF RIGHT KNEE: Primary | ICD-10-CM

## 2023-01-31 DIAGNOSIS — M25.561 RIGHT KNEE PAIN, UNSPECIFIED CHRONICITY: ICD-10-CM

## 2023-01-31 PROCEDURE — 73564 X-RAY EXAM KNEE 4 OR MORE: CPT

## 2023-01-31 PROCEDURE — 99203 OFFICE O/P NEW LOW 30 MIN: CPT | Performed by: ORTHOPAEDIC SURGERY

## 2023-01-31 NOTE — PROGRESS NOTES
Identified pt with two pt identifiers (name and ). Reviewed chart in preparation for visit and have obtained necessary documentation. Neel Sheehan is a 60 y.o. male  Chief Complaint   Patient presents with    Knee Pain     RT Knee     Visit Vitals  BP (!) 140/79 (BP 1 Location: Right arm, BP Patient Position: Sitting, BP Cuff Size: Large adult)   Pulse 70   Temp 97.5 °F (36.4 °C) (Tympanic)   Ht 6' (1.829 m)   Wt 230 lb (104.3 kg)   SpO2 97%   BMI 31.19 kg/m²     1. Have you been to the ER, urgent care clinic since your last visit? Hospitalized since your last visit? No    2. Have you seen or consulted any other health care providers outside of the [address] [place] [address] since your last visit? Include any pap smears or colon screening.  No

## 2023-01-31 NOTE — PROGRESS NOTES
1/31/2023      CC: Right knee pain    HPI:      This is a 60y.o. year old male who complains of right knee pain, this is on the lateral aspect of the knee, it has been present for years. Onset was after a football injury. This pain is activity dependent, it causes a significant amount of difficulty with athletic activities and stairs. The patient complains of locking and mechanical symptoms and clicking within the knee. PMH:  Past Medical History:   Diagnosis Date    Diabetes ([address]     Dyspepsia and other specified disorders of function of stomach     GERD (gastroesophageal reflux disease)     Hemorrhoids     Psoriasis     Psoriasis     Umbilical hernia     Umbilical hernia 7/09/2696       PSxHx:  Past Surgical History:   Procedure Laterality Date    HX APPENDECTOMY      HX HERNIA REPAIR  6-9-15    repair of umbilical henia with underlay mesh-[facility]-Dr. Sharee Kumar    HX TONSILLECTOMY         Meds:    Current Outpatient Medications:     clobetasoL (TEMOVATE) 0.05 % topical cream, Apply  to affected area two (2) times a day., Disp: 60 g, Rfl: 5    triamcinolone acetonide (KENALOG) 0.1 % topical cream, APPLY A THIN LAYER TO THE AFFECTED AREA TWICE DAILY, Disp: 60 g, Rfl: 5    pravastatin (PRAVACHOL) 20 mg tablet, TAKE 1 TABLET BY MOUTH ONCE DAILY, Disp: 90 Tablet, Rfl: 3    SITagliptin phosphate (Januvia) 100 mg tablet, TAKE 1 TABLET BY MOUTH ONCE DAILY, Disp: 90 Tablet, Rfl: 3    tamsulosin (FLOMAX) 0.4 mg capsule, TAKE 1 CAPSULE BY MOUTH  ONCE DAILY, Disp: 90 Capsule, Rfl: 3    Accu-Chek Ciera Plus test strp strip, TEST TWICE DAILY AS  DIRECTED, Disp: 200 Strip, Rfl: 3    escitalopram oxalate (LEXAPRO) 20 mg tablet, TAKE 1 TABLET BY MOUTH  DAILY, Disp: 90 Tablet, Rfl: 3    budesonide-formoteroL (Symbicort) 80-4.5 mcg/actuation HFAA, Take 2 Puffs by inhalation two (2) times a day., Disp: 1 Each, Rfl: 5    tadalafiL (Cialis) 20 mg tablet, Take 1 Tablet by mouth daily as needed for Erectile Dysfunction. , Disp: 6 Tablet, Rfl: 5    metFORMIN (GLUCOPHAGE) 500 mg tablet, TAKE 2 TABLETS BY MOUTH  TWICE DAILY, Disp: 360 Tablet, Rfl: 3    albuterol (PROVENTIL HFA, VENTOLIN HFA, PROAIR HFA) 90 mcg/actuation inhaler, Take 2 Puffs by inhalation every four (4) hours as needed for Wheezing., Disp: 1 Each, Rfl: 5    diclofenac EC (VOLTAREN) 50 mg EC tablet, Take 1 Tab by mouth two (2) times a day., Disp: 30 Tab, Rfl: 0    Cetirizine 10 mg cap, Take  by mouth as needed. , Disp: , Rfl:     olmesartan (BENICAR) 20 mg tablet, TAKE 1 TABLET BY MOUTH  DAILY (Patient not taking: No sig reported), Disp: 90 Tablet, Rfl: 3    omeprazole (PRILOSEC) 20 mg capsule, Take 1 Cap by mouth daily.  (Patient not taking: No sig reported), Disp: 20 Cap, Rfl: 0    All:  No Active Allergies    Social Hx:  Social History     Socioeconomic History    Marital status:    Tobacco Use    Smoking status: Former     Packs/day: 2.00     Years: 21.00     Pack years: 42.00     Types: Cigarettes     Quit date: 1998     Years since quittin.0    Smokeless tobacco: Never   Substance and Sexual Activity    Alcohol use: Not Currently     Alcohol/week: 1.7 standard drinks     Types: 2 Cans of beer per week     Comment: sometimes    Drug use: No    Sexual activity: Yes     Partners: Female     Social Determinants of Health     Financial Resource Strain: Low Risk     Difficulty of Paying Living Expenses: Not hard at all   Food Insecurity: No Food Insecurity    Worried About Running Out of Food in the Last Year: Never true    Ran Out of Food in the Last Year: Never true   Physical Activity: Inactive    Days of Exercise per Week: 0 days    Minutes of Exercise per Session: 30 min       Family Hx:  Family History   Problem Relation Age of Onset    Heart Attack Father          63's    Diabetes Father          Review of Systems:       General: Denies headache, lethargy, fever, weight loss  Ears/Nose/Throat: Denies ear discharge, drainage, nosebleeds, hoarse voice, dental problems  Cardiovascular: Denies chest pain, shortness of breath  Lungs: Denies chest pain, breathing problems, wheezing, pneumonia  Stomach: Denies stomach pain, heartburn, constipation, irritable bowel  Skin: Denies rash, sores, open wounds  Musculoskeletal: Right knee pain  Genitourinary: Denies dysuria, hematuria, polyuria  Gastrointestinal: Denies constipation, obstipation, diarrhea  Neurological: Denies changes in sight, smell, hearing, taste, seizures. Denies loss of consciousness. Psychiatric: Denies depression, sleep pattern changes, anxiety, change in personality  Endocrine: Denies mood swings, heat or cold intolerance  Hematologic/Lymphatic: Denies anemia, purpura, petechia  Allergic/Immunologic: Denies swelling of throat, pain or swelling at lymph nodes      Physical Examination:    Visit Vitals  BP (!) 140/79 (BP 1 Location: Right arm, BP Patient Position: Sitting, BP Cuff Size: Large adult)   Pulse 70   Temp 97.5 °F (36.4 °C) (Tympanic)   Ht 6' (1.829 m)   Wt 230 lb (104.3 kg)   SpO2 97%   BMI 31.19 kg/m²        General: AOX3, no apparent distress  Psychiatric: mood and affect appropriate  Lungs: breathing is symmetric and unlabored bilaterally  Heart: regular rate and rhythm  Abdomen: no guarding  Head: normocephalic, atraumatic  Skin: No significant abnormalities, good turgor  Sensation intact to light touch: C5-T1 dermatomes  Muscular exam: 5/5 strength in all major muscle groups unless noted in specialty exam.    Extremities      Right upper extremity: Extremity is examined, no evidence of gross deformity, no gross motor or sensory deficit. Full active and passive range of motion is noted. Muscle tone and bulk is within normal expected limits. Capillary refill is less than 2 seconds distally. Left upper extremity: Extremity is examined, no evidence of gross deformity, no gross motor or sensory deficit. Full active and passive range of motion is noted.   Muscle tone and bulk is within normal expected limits. Capillary refill is less than 2 seconds distally. Left lower extremity: Extremity is examined, no evidence of gross deformity, no gross motor or sensory deficit. Full active and passive range of motion is noted. Muscle tone and bulk is within normal expected limits. Capillary refill is less than 2 seconds distally. Right lower extremity: No gross deformity. Knee indicates small effusion. Significant joint line tenderness to palpation laterally, not medially. Positive Lorena's laterally, not medially. ACL, PCL, MCL, LCL are all intact to ligamentous testing. Capillary refill is less than 2 seconds distally. Knee flexion and extension is 5 out of 5 strength. Patella tracks centrally, no patellar grind. Diagnostics:    Pertinent Diagnostics: Xrays are available of the right knee, they indicate mild medial compartment osteoarthritis of the knee joint, no significant other findings, no other osseus abnormalities, fractures, or dislocations. Slight lateral tilt patella. Assessment: Right knee pain, likely secondary to lateral meniscal tear    Plan: This patient has the above-mentioned issue, I have had a long discussion with them regarding the treatment options which include nonoperative and operative. Due to the length of symptoms, as well as the clinical scenario, we have agreed to proceed with nonoperative management. I have advised the patient that should they have worsening symptoms, mechanical blockage to motion, or locked knee that they should call on an emergent basis. Otherwise, I did discuss the treatment options for this particular issue which include bracing, physical therapy, anti-inflammatories and pain medications as well as activity modification. I have also discussed the long-term effects of this particular issue which include progression of osteoarthritis faster than would normally have occurred.   The patient will have an MRI    Follow-up will be after imaging, the patient does not need x-rays on follow-up. Mr. Nadeen Toscano has a reminder for a \"due or due soon\" health maintenance. I have asked that he contact his primary care provider for follow-up on this health maintenance.

## 2023-01-31 NOTE — LETTER
1/31/2023    Patient: Sharita Oquendo   YOB: 1960   Date of Visit: 1/31/2023     Vamsi Conde MD  [address]  [address]  [address] [id number]  [facility] [facility] [address]    Dear Vamsi Conde MD,      Thank you for referring Mr. Abiel Metzger to [facility] for evaluation. My notes for this consultation are attached. If you have questions, please do not hesitate to call me. I look forward to following your patient along with you.       Sincerely,    Hailey Stanford, DO

## 2023-07-18 ENCOUNTER — OFFICE VISIT (OUTPATIENT)
Age: 63
End: 2023-07-18
Payer: COMMERCIAL

## 2023-07-18 VITALS
WEIGHT: 233.8 LBS | TEMPERATURE: 97.5 F | HEART RATE: 81 BPM | BODY MASS INDEX: 31.67 KG/M2 | OXYGEN SATURATION: 95 % | DIASTOLIC BLOOD PRESSURE: 70 MMHG | SYSTOLIC BLOOD PRESSURE: 126 MMHG | HEIGHT: 72 IN | RESPIRATION RATE: 16 BRPM

## 2023-07-18 DIAGNOSIS — Z11.4 ENCOUNTER FOR SCREENING FOR HIV: ICD-10-CM

## 2023-07-18 DIAGNOSIS — J44.9 CHRONIC OBSTRUCTIVE PULMONARY DISEASE, UNSPECIFIED COPD TYPE (HCC): ICD-10-CM

## 2023-07-18 DIAGNOSIS — E78.5 HYPERLIPIDEMIA, UNSPECIFIED HYPERLIPIDEMIA TYPE: ICD-10-CM

## 2023-07-18 DIAGNOSIS — Z12.5 PROSTATE CANCER SCREENING: ICD-10-CM

## 2023-07-18 DIAGNOSIS — I10 HYPERTENSION, UNSPECIFIED TYPE: ICD-10-CM

## 2023-07-18 DIAGNOSIS — L25.9 CONTACT DERMATITIS, UNSPECIFIED CONTACT DERMATITIS TYPE, UNSPECIFIED TRIGGER: ICD-10-CM

## 2023-07-18 DIAGNOSIS — E11.9 TYPE 2 DIABETES MELLITUS WITHOUT COMPLICATION, WITHOUT LONG-TERM CURRENT USE OF INSULIN (HCC): Primary | ICD-10-CM

## 2023-07-18 PROCEDURE — 3044F HG A1C LEVEL LT 7.0%: CPT | Performed by: INTERNAL MEDICINE

## 2023-07-18 PROCEDURE — 99214 OFFICE O/P EST MOD 30 MIN: CPT | Performed by: INTERNAL MEDICINE

## 2023-07-18 PROCEDURE — 3074F SYST BP LT 130 MM HG: CPT | Performed by: INTERNAL MEDICINE

## 2023-07-18 PROCEDURE — 3078F DIAST BP <80 MM HG: CPT | Performed by: INTERNAL MEDICINE

## 2023-07-18 RX ORDER — SEMAGLUTIDE 1.34 MG/ML
0.25 INJECTION, SOLUTION SUBCUTANEOUS
Qty: 1 ADJUSTABLE DOSE PRE-FILLED PEN SYRINGE | Refills: 5 | Status: SHIPPED | OUTPATIENT
Start: 2023-07-18

## 2023-07-18 RX ORDER — BLOOD SUGAR DIAGNOSTIC
STRIP MISCELLANEOUS
COMMUNITY
Start: 2021-10-13

## 2023-07-18 RX ORDER — BUDESONIDE AND FORMOTEROL FUMARATE DIHYDRATE 80; 4.5 UG/1; UG/1
2 AEROSOL RESPIRATORY (INHALATION) 2 TIMES DAILY
Qty: 10.2 G | Refills: 11 | Status: SHIPPED | OUTPATIENT
Start: 2023-07-18

## 2023-07-18 RX ORDER — SEMAGLUTIDE 1.34 MG/ML
0.25 INJECTION, SOLUTION SUBCUTANEOUS WEEKLY
Qty: 4 ADJUSTABLE DOSE PRE-FILLED PEN SYRINGE | Refills: 5 | Status: SHIPPED | OUTPATIENT
Start: 2023-07-18 | End: 2023-07-18

## 2023-07-18 SDOH — ECONOMIC STABILITY: HOUSING INSECURITY
IN THE LAST 12 MONTHS, WAS THERE A TIME WHEN YOU DID NOT HAVE A STEADY PLACE TO SLEEP OR SLEPT IN A SHELTER (INCLUDING NOW)?: NO

## 2023-07-18 SDOH — ECONOMIC STABILITY: FOOD INSECURITY: WITHIN THE PAST 12 MONTHS, YOU WORRIED THAT YOUR FOOD WOULD RUN OUT BEFORE YOU GOT MONEY TO BUY MORE.: NEVER TRUE

## 2023-07-18 SDOH — ECONOMIC STABILITY: FOOD INSECURITY: WITHIN THE PAST 12 MONTHS, THE FOOD YOU BOUGHT JUST DIDN'T LAST AND YOU DIDN'T HAVE MONEY TO GET MORE.: NEVER TRUE

## 2023-07-18 SDOH — ECONOMIC STABILITY: INCOME INSECURITY: HOW HARD IS IT FOR YOU TO PAY FOR THE VERY BASICS LIKE FOOD, HOUSING, MEDICAL CARE, AND HEATING?: NOT HARD AT ALL

## 2023-07-18 NOTE — PROGRESS NOTES
1. \"Have you been to the ER, urgent care clinic since your last visit? Hospitalized since your last visit? \"         [facility] visit 6/2023 rash     2. \"Have you seen or consulted any other health care providers outside of the [address] [place] [address] since your last visit? \" No     3. For patients aged 43-73: Has the patient had a colonoscopy / FIT/ Cologuard? No      If the patient is female:    4. For patients aged 43-66: Has the patient had a mammogram within the past 2 years? No      5. For patients aged 21-65: Has the patient had a pap smear?   No

## 2023-07-18 NOTE — PROGRESS NOTES
HISTORY OF PRESENT ILLNESS   Sujit Paris   is a 60 y.o.  male. FU  diabetes hypertension hyperlipidemia obesity MDD/anxiety copd-moderate psorasis   Last a1c 6.0 LDL 96  Fsbs 120 in AM fasting  He requests ozempic to help with weight and diabetes control    Copd symptoms controlled on symbicort prn-former smoker quit 1998  Not SOB with daily activities    Went to  for rash in right chest and thighs-contact DERM--tx with pred taper and doxycycline-overall 80 % better but still red and itchy right anteroir shoulder area        No etoh  Working [employer] from home      Last OV  Last a1c 5.9 LDL 60  Depression/anxiety on lexapro-controlled  Fsbs around 120   Quit tobacco 1998-no sob walking   Some SZYMANSKI with heavy work   Walks a lot at work and during the day  Rare etoh  Request refill of steroid crms for psoriasis of elbow and knees-not interested in oral medication  C/o some right knee pain around knee cap a few times per day which improves with knee extensions  ER FU  Went toER at [employer] [facility] for hematuria and left flank pain-U cx-no growth but was started on bactrim. U A tr wbc, positive blood  CT-large 9 cm renal cyst /hypodensity and prominent liver with multople small hypodensities  Renal US recommended and possible liver MRI to characterize     went back to ER 7/9 for dysuria--after completing bactrim  UA -0- wbc, > 100 rbc--culture negative but was started on levaquin 500mg every day x 10d  A few days ago he passed stone and clot at home-feels well now and no recurrent hematuria per pt        bp has been low at home last weekend so stayed off his bp medication this week  [facility] with contrast ordered and may need cysto          Current Outpatient Medications   Medication Sig Dispense Refill    predniSONE 10 MG (21) TBPK Take 6 tabs by mouth on day 1. then 5 tabs on day 2. then 4 tabs on day 3. then 3 tabs on day 4. Then 2 tabs on day 5. Then 1 tab on day 6. Take with food.  1 each 0

## 2023-07-19 LAB
ANION GAP SERPL CALC-SCNC: 2 MMOL/L (ref 5–15)
BUN SERPL-MCNC: 15 MG/DL (ref 6–20)
BUN/CREAT SERPL: 14 (ref 12–20)
CALCIUM SERPL-MCNC: 9.4 MG/DL (ref 8.5–10.1)
CHLORIDE SERPL-SCNC: 105 MMOL/L (ref 97–108)
CO2 SERPL-SCNC: 30 MMOL/L (ref 21–32)
CREAT SERPL-MCNC: 1.05 MG/DL (ref 0.7–1.3)
EST. AVERAGE GLUCOSE BLD GHB EST-MCNC: 126 MG/DL
GLUCOSE SERPL-MCNC: 95 MG/DL (ref 65–100)
HBA1C MFR BLD: 6 % (ref 4–5.6)
HIV 1+2 AB+HIV1 P24 AG SERPL QL IA: NONREACTIVE
HIV 1/2 RESULT COMMENT: NORMAL
POTASSIUM SERPL-SCNC: 4.1 MMOL/L (ref 3.5–5.1)
SODIUM SERPL-SCNC: 137 MMOL/L (ref 136–145)

## 2023-07-20 LAB
PSA SERPL-MCNC: 0.9 NG/ML (ref 0–4)
REFLEX CRITERIA: NORMAL

## 2023-08-03 RX ORDER — TAMSULOSIN HYDROCHLORIDE 0.4 MG/1
0.4 CAPSULE ORAL DAILY
Qty: 90 CAPSULE | Refills: 3 | Status: SHIPPED | OUTPATIENT
Start: 2023-08-03

## 2023-08-03 NOTE — TELEPHONE ENCOUNTER
metFORMIN (GLUCOPHAGE) 500 MG tablet    tamsulosin (FLOMAX) 0.4 MG capsule    [facility] #[phone]

## 2023-08-07 DIAGNOSIS — E78.5 HYPERLIPIDEMIA, UNSPECIFIED HYPERLIPIDEMIA TYPE: Primary | ICD-10-CM

## 2023-08-07 NOTE — TELEPHONE ENCOUNTER
pravastatin (PRAVACHOL) 20 MG tablet    [facility] #[phone]    Pt is out of medication. Advised of 48/72 hour turn around.

## 2023-08-08 RX ORDER — PRAVASTATIN SODIUM 20 MG
20 TABLET ORAL DAILY
Qty: 30 TABLET | Refills: 5 | Status: SHIPPED | OUTPATIENT
Start: 2023-08-08

## 2023-12-05 NOTE — TELEPHONE ENCOUNTER
PAIN Epidural block    Pre-sedation assessment completed: 12/5/2023 8:09 AM    Patient reassessed immediately prior to procedure    Patient location during procedure: pain clinic  Start Time: 12/5/2023 8:09 AM  Stop Time: 12/5/2023 8:17 AM  Indication:procedure for pain  Performed By  Anesthesiologist: Dolly Whipple MD  Preanesthetic Checklist  Completed: patient identified, IV checked, site marked, risks and benefits discussed, surgical consent, monitors and equipment checked, pre-op evaluation and timeout performed  Additional Notes  Fluoro used.    Prep:  Pt Position:prone  Sterile Tech:cap, gloves, mask and sterile barrier  Prep:chlorhexidine gluconate and isopropyl alcohol  Monitoring:blood pressure monitoring, continuous pulse oximetry and EKG  Procedure:Sedation: no     Approach:right paramedian  Guidance: fluoroscopy  Location:lumbar  Level:L5-S1  Needle Type:Tuohy  Needle Gauge:20  Aspiration:negative  Medications:  Preservative Free Saline:3mL  Isovue:0mL  Comments:No dye indicated.   Dx: intervertebral disc d/o w/ lumbar radiculopathyDepomedrol:80  Post Assessment:  Dressing:occlusive dressing applied  Pt Tolerance:patient tolerated the procedure well with no apparent complications  Complications:no             PCP: Nidhi Haque MD    Last appt: 7/18/2023  No future appointments.     Requested Prescriptions     Pending Prescriptions Disp Refills    pravastatin (PRAVACHOL) 20 MG tablet 30 tablet 5     Sig: Take 1 tablet by mouth daily

## 2024-03-07 DIAGNOSIS — E78.5 HYPERLIPIDEMIA, UNSPECIFIED HYPERLIPIDEMIA TYPE: ICD-10-CM

## 2024-03-07 DIAGNOSIS — J44.9 CHRONIC OBSTRUCTIVE PULMONARY DISEASE, UNSPECIFIED COPD TYPE (HCC): ICD-10-CM

## 2024-03-07 RX ORDER — PRAVASTATIN SODIUM 20 MG
20 TABLET ORAL DAILY
Qty: 30 TABLET | Refills: 5 | Status: SHIPPED | OUTPATIENT
Start: 2024-03-07

## 2024-03-07 RX ORDER — BUDESONIDE AND FORMOTEROL FUMARATE DIHYDRATE 80; 4.5 UG/1; UG/1
2 AEROSOL RESPIRATORY (INHALATION) 2 TIMES DAILY
Qty: 10.2 G | Refills: 11 | Status: SHIPPED | OUTPATIENT
Start: 2024-03-07

## 2024-07-01 RX ORDER — ESCITALOPRAM OXALATE 20 MG/1
20 TABLET ORAL DAILY
Qty: 30 TABLET | Refills: 5 | Status: SHIPPED | OUTPATIENT
Start: 2024-07-01

## 2024-07-01 NOTE — TELEPHONE ENCOUNTER
PCP: Mika Alejandro MD    Last appt: 7/18/2023   No future appointments.    Requested Prescriptions     Pending Prescriptions Disp Refills    escitalopram (LEXAPRO) 20 MG tablet 30 tablet 0     Sig: Take 1 tablet by mouth daily   Last filled 2022  No future appts scheduled

## 2024-07-01 NOTE — TELEPHONE ENCOUNTER
escitalopram (LEXAPRO) 20 MG tablet      Pt is requesting a refill to go to [facility].   #[phone]

## 2024-07-21 PROBLEM — R31.0 GROSS HEMATURIA: Status: ACTIVE | Noted: 2024-07-21

## 2024-08-29 DIAGNOSIS — E78.5 HYPERLIPIDEMIA, UNSPECIFIED HYPERLIPIDEMIA TYPE: ICD-10-CM

## 2024-08-29 RX ORDER — ESCITALOPRAM OXALATE 20 MG/1
20 TABLET ORAL DAILY
Qty: 30 TABLET | Refills: 5 | Status: SHIPPED | OUTPATIENT
Start: 2024-08-29

## 2024-08-29 RX ORDER — PRAVASTATIN SODIUM 20 MG
20 TABLET ORAL DAILY
Qty: 30 TABLET | Refills: 5 | Status: SHIPPED | OUTPATIENT
Start: 2024-08-29

## 2024-08-29 RX ORDER — TAMSULOSIN HYDROCHLORIDE 0.4 MG/1
0.4 CAPSULE ORAL DAILY
Qty: 90 CAPSULE | Refills: 3 | Status: SHIPPED | OUTPATIENT
Start: 2024-08-29

## 2024-09-04 RX ORDER — SEMAGLUTIDE 1.34 MG/ML
0.25 INJECTION, SOLUTION SUBCUTANEOUS
Qty: 1 ADJUSTABLE DOSE PRE-FILLED PEN SYRINGE | Refills: 5 | Status: SHIPPED | OUTPATIENT
Start: 2024-09-04

## 2024-09-11 ENCOUNTER — TELEPHONE (OUTPATIENT)
Age: 64
End: 2024-09-11

## 2024-09-27 DIAGNOSIS — E78.5 HYPERLIPIDEMIA, UNSPECIFIED HYPERLIPIDEMIA TYPE: ICD-10-CM

## 2024-09-27 RX ORDER — PRAVASTATIN SODIUM 20 MG
20 TABLET ORAL DAILY
Qty: 30 TABLET | Refills: 5 | Status: SHIPPED | OUTPATIENT
Start: 2024-09-27

## 2024-12-31 ENCOUNTER — PATIENT MESSAGE (OUTPATIENT)
Age: 64
End: 2024-12-31

## 2024-12-31 RX ORDER — CLOBETASOL PROPIONATE 0.5 MG/G
CREAM TOPICAL 2 TIMES DAILY
Qty: 60 G | Refills: 0 | Status: SHIPPED | OUTPATIENT
Start: 2024-12-31

## 2024-12-31 NOTE — TELEPHONE ENCOUNTER
PCP: Mika Alejandro MD    Last appt: 7/18/2023   Future Appointments   Date Time Provider Department Center   2/17/2025  2:30 PM Mika Alejandro MD [facility] DEP       Requested Prescriptions     Pending Prescriptions Disp Refills    clobetasol (TEMOVATE) 0.05 % cream 60 g 0     Sig: Apply topically 2 times daily

## 2025-02-13 ENCOUNTER — TELEPHONE (OUTPATIENT)
Age: 65
End: 2025-02-13

## 2025-02-13 RX ORDER — SEMAGLUTIDE 1.34 MG/ML
0.5 INJECTION, SOLUTION SUBCUTANEOUS
Qty: 1 ADJUSTABLE DOSE PRE-FILLED PEN SYRINGE | Refills: 5 | Status: SHIPPED | OUTPATIENT
Start: 2025-02-13

## 2025-02-13 NOTE — TELEPHONE ENCOUNTER
Spoke with patient.   Pt taking 0.25mg for a while  Pt has medicare now. Insurance told pt he was refilling too early  Pt went up to 0.5mg since last year.     Pt needs new script that he is injecting 0.5mg weekly

## 2025-02-13 NOTE — TELEPHONE ENCOUNTER
Patient would like a call back to discuss Ozempic dosage.    Semaglutide,0.25 or 0.5MG/DOS, (OZEMPIC, 0.25 OR 0.5 MG/DOSE,) 2 MG/1.5ML SOPN

## 2025-02-17 ENCOUNTER — OFFICE VISIT (OUTPATIENT)
Age: 65
End: 2025-02-17

## 2025-02-17 VITALS
HEART RATE: 72 BPM | BODY MASS INDEX: 31.26 KG/M2 | WEIGHT: 230.8 LBS | TEMPERATURE: 97.3 F | SYSTOLIC BLOOD PRESSURE: 120 MMHG | DIASTOLIC BLOOD PRESSURE: 70 MMHG | OXYGEN SATURATION: 96 % | HEIGHT: 72 IN | RESPIRATION RATE: 16 BRPM

## 2025-02-17 DIAGNOSIS — Z12.5 PROSTATE CANCER SCREENING: ICD-10-CM

## 2025-02-17 DIAGNOSIS — E78.5 HYPERLIPIDEMIA, UNSPECIFIED HYPERLIPIDEMIA TYPE: ICD-10-CM

## 2025-02-17 DIAGNOSIS — E11.9 TYPE 2 DIABETES MELLITUS WITHOUT COMPLICATION, WITHOUT LONG-TERM CURRENT USE OF INSULIN (HCC): ICD-10-CM

## 2025-02-17 DIAGNOSIS — Z00.00 INITIAL MEDICARE ANNUAL WELLNESS VISIT: ICD-10-CM

## 2025-02-17 DIAGNOSIS — J44.9 CHRONIC OBSTRUCTIVE PULMONARY DISEASE, UNSPECIFIED COPD TYPE (HCC): ICD-10-CM

## 2025-02-17 DIAGNOSIS — Z23 ENCOUNTER FOR IMMUNIZATION: ICD-10-CM

## 2025-02-17 DIAGNOSIS — I10 HYPERTENSION, UNSPECIFIED TYPE: ICD-10-CM

## 2025-02-17 DIAGNOSIS — Z00.00 WELCOME TO MEDICARE PREVENTIVE VISIT: ICD-10-CM

## 2025-02-17 DIAGNOSIS — B35.1 ONYCHOMYCOSIS: ICD-10-CM

## 2025-02-17 DIAGNOSIS — F41.9 ANXIETY: Primary | ICD-10-CM

## 2025-02-17 DIAGNOSIS — Z12.11 COLON CANCER SCREENING: ICD-10-CM

## 2025-02-17 SDOH — ECONOMIC STABILITY: FOOD INSECURITY: WITHIN THE PAST 12 MONTHS, THE FOOD YOU BOUGHT JUST DIDN'T LAST AND YOU DIDN'T HAVE MONEY TO GET MORE.: NEVER TRUE

## 2025-02-17 SDOH — ECONOMIC STABILITY: FOOD INSECURITY: WITHIN THE PAST 12 MONTHS, YOU WORRIED THAT YOUR FOOD WOULD RUN OUT BEFORE YOU GOT MONEY TO BUY MORE.: NEVER TRUE

## 2025-02-17 ASSESSMENT — PATIENT HEALTH QUESTIONNAIRE - PHQ9
4. FEELING TIRED OR HAVING LITTLE ENERGY: NOT AT ALL
8. MOVING OR SPEAKING SO SLOWLY THAT OTHER PEOPLE COULD HAVE NOTICED. OR THE OPPOSITE, BEING SO FIGETY OR RESTLESS THAT YOU HAVE BEEN MOVING AROUND A LOT MORE THAN USUAL: NOT AT ALL
SUM OF ALL RESPONSES TO PHQ9 QUESTIONS 1 & 2: 0
3. TROUBLE FALLING OR STAYING ASLEEP: NOT AT ALL
9. THOUGHTS THAT YOU WOULD BE BETTER OFF DEAD, OR OF HURTING YOURSELF: NOT AT ALL
SUM OF ALL RESPONSES TO PHQ QUESTIONS 1-9: 0
2. FEELING DOWN, DEPRESSED OR HOPELESS: NOT AT ALL
SUM OF ALL RESPONSES TO PHQ QUESTIONS 1-9: 0
5. POOR APPETITE OR OVEREATING: NOT AT ALL
SUM OF ALL RESPONSES TO PHQ QUESTIONS 1-9: 0
SUM OF ALL RESPONSES TO PHQ QUESTIONS 1-9: 0
7. TROUBLE CONCENTRATING ON THINGS, SUCH AS READING THE NEWSPAPER OR WATCHING TELEVISION: NOT AT ALL
10. IF YOU CHECKED OFF ANY PROBLEMS, HOW DIFFICULT HAVE THESE PROBLEMS MADE IT FOR YOU TO DO YOUR WORK, TAKE CARE OF THINGS AT HOME, OR GET ALONG WITH OTHER PEOPLE: NOT DIFFICULT AT ALL
6. FEELING BAD ABOUT YOURSELF - OR THAT YOU ARE A FAILURE OR HAVE LET YOURSELF OR YOUR FAMILY DOWN: NOT AT ALL
1. LITTLE INTEREST OR PLEASURE IN DOING THINGS: NOT AT ALL

## 2025-02-17 ASSESSMENT — LIFESTYLE VARIABLES
HOW MANY STANDARD DRINKS CONTAINING ALCOHOL DO YOU HAVE ON A TYPICAL DAY: PATIENT DOES NOT DRINK
HOW OFTEN DO YOU HAVE A DRINK CONTAINING ALCOHOL: NEVER

## 2025-02-17 NOTE — PROGRESS NOTES
\"Have you been to the ER, urgent care clinic since your last visit?  Hospitalized since your last visit?\"    No    “Have you seen or consulted any other health care providers outside our system since your last visit?”    [facility] for kidney stones      “Have you had a colorectal cancer screening such as a colonoscopy/FIT/Cologuard?    NO    Date of last Colonoscopy: 4/1/2019  No cologuard on file  No FIT/FOBT on file   No flexible sigmoidoscopy on file     “Have you had a diabetic eye exam?”    NO     No diabetic eye exam on file            
Consider Prediabetes  >6.5              Consider Diabetes          Review of Systems   Constitutional: Negative.    HENT: Negative.     Eyes: Negative.    Respiratory: Negative.     Endocrine: Negative.    Genitourinary: Negative.         Physical Exam  Constitutional:       Appearance: Normal appearance. He is obese.   HENT:      Head: Normocephalic and atraumatic.      Right Ear: Tympanic membrane normal.      Left Ear: Tympanic membrane normal.      Nose: Nose normal.      Mouth/Throat:      Mouth: Mucous membranes are moist.   Eyes:      Pupils: Pupils are equal, round, and reactive to light.   Cardiovascular:      Rate and Rhythm: Normal rate and regular rhythm.      Pulses: Normal pulses.      Heart sounds: Normal heart sounds.   Pulmonary:      Effort: Pulmonary effort is normal.   Abdominal:      General: Abdomen is flat.   Musculoskeletal:         General: Normal range of motion.      Cervical back: Normal range of motion and neck supple.   Skin:     General: Skin is warm.      Comments: Onychomycosis right first toenail   Neurological:      General: No focal deficit present.      Mental Status: He is alert.      Comments: Diabetic foot exam:   Left Foot:   Visual Exam: normal   Pulse DP: 2+ (normal)   Filament test: normal sensation   Vibratory Sensation: normal  Right Foot:   Visual Exam: normal   Pulse DP: 2+ (normal)   Filament test: normal sensation   Vibratory Sensation: normal    Psychiatric:         Mood and Affect: Mood normal.         Thought Content: Thought content normal.         Judgment: Judgment normal.        ASSESSMENT and PLAN  There are no diagnoses linked to this encounter.   Teddy was seen today for medicare awv.    Diagnoses and all orders for this visit:    Anxiety   Controlled on SSRI  Hypertension, unspecified type  -     CBC; Future  -     Comprehensive Metabolic Panel; Future  -     AMB POC EKG ROUTINE W/ 12 LEADS, SCREEN (-INITIAL PREV EXAM)   Controlled off

## 2025-02-17 NOTE — PATIENT INSTRUCTIONS
directed, managing other health conditions, and trying to get a healthy amount of sleep.  Follow-up care is a key part of your treatment and safety. Be sure to make and go to all appointments, and call your doctor if you are having problems. It's also a good idea to know your test results and keep a list of the medicines you take.  How can you care for yourself at home?  Diet    Use less salt when you cook and eat. This helps lower your blood pressure. Taste food before salting. Add only a little salt when you think you need it. With time, your taste buds will adjust to less salt.     Eat fewer snack items, fast foods, canned soups, and other high-salt, high-fat, processed foods.     Read food labels and try to avoid saturated and trans fats. They increase your risk of heart disease by raising cholesterol levels.     Limit the amount of solid fat--butter, margarine, and shortening--you eat. Use olive, peanut, or canola oil when you cook. Bake, broil, and steam foods instead of frying them.     Eat a variety of fruit and vegetables every day. Dark green, deep orange, red, or yellow fruits and vegetables are especially good for you. Examples include spinach, carrots, peaches, and berries.     Foods high in fiber can reduce your cholesterol and provide important vitamins and minerals. High-fiber foods include whole-grain cereals and breads, oatmeal, beans, brown rice, citrus fruits, and apples.     Eat lean proteins. Heart-healthy proteins include seafood, lean meats and poultry, eggs, beans, peas, nuts, seeds, and soy products.     Limit drinks and foods with added sugar. These include candy, desserts, and soda pop.   Heart-healthy lifestyle    If your doctor recommends it, get more exercise. For many people, walking is a good choice. Or you may want to swim, bike, or do other activities. Bit by bit, increase the time you're active every day. Try for at least 30 minutes on most days of the week.     Try to quit or cut

## 2025-02-18 LAB
ALBUMIN SERPL-MCNC: 3.7 G/DL (ref 3.5–5)
ALBUMIN/GLOB SERPL: 1 (ref 1.1–2.2)
ALP SERPL-CCNC: 69 U/L (ref 45–117)
ALT SERPL-CCNC: 29 U/L (ref 12–78)
ANION GAP SERPL CALC-SCNC: 6 MMOL/L (ref 2–12)
AST SERPL-CCNC: 16 U/L (ref 15–37)
BILIRUB SERPL-MCNC: 0.4 MG/DL (ref 0.2–1)
BUN SERPL-MCNC: 16 MG/DL (ref 6–20)
BUN/CREAT SERPL: 17 (ref 12–20)
CALCIUM SERPL-MCNC: 10.1 MG/DL (ref 8.5–10.1)
CHLORIDE SERPL-SCNC: 102 MMOL/L (ref 97–108)
CHOLEST SERPL-MCNC: 189 MG/DL
CO2 SERPL-SCNC: 31 MMOL/L (ref 21–32)
CREAT SERPL-MCNC: 0.96 MG/DL (ref 0.7–1.3)
CREAT UR-MCNC: 94.5 MG/DL
ERYTHROCYTE [DISTWIDTH] IN BLOOD BY AUTOMATED COUNT: 13.5 % (ref 11.5–14.5)
EST. AVERAGE GLUCOSE BLD GHB EST-MCNC: 131 MG/DL
GLOBULIN SER CALC-MCNC: 3.6 G/DL (ref 2–4)
GLUCOSE SERPL-MCNC: 85 MG/DL (ref 65–100)
HBA1C MFR BLD: 6.2 % (ref 4–5.6)
HCT VFR BLD AUTO: 43.3 % (ref 36.6–50.3)
HDLC SERPL-MCNC: 55 MG/DL
HDLC SERPL: 3.4 (ref 0–5)
HGB BLD-MCNC: 13.6 G/DL (ref 12.1–17)
LDLC SERPL CALC-MCNC: 115.8 MG/DL (ref 0–100)
MCH RBC QN AUTO: 27.9 PG (ref 26–34)
MCHC RBC AUTO-ENTMCNC: 31.4 G/DL (ref 30–36.5)
MCV RBC AUTO: 88.9 FL (ref 80–99)
MICROALBUMIN UR-MCNC: 1.67 MG/DL
MICROALBUMIN/CREAT UR-RTO: 18 MG/G (ref 0–30)
NRBC # BLD: 0 K/UL (ref 0–0.01)
NRBC BLD-RTO: 0 PER 100 WBC
PLATELET # BLD AUTO: 196 K/UL (ref 150–400)
PMV BLD AUTO: 10.9 FL (ref 8.9–12.9)
POTASSIUM SERPL-SCNC: 4.9 MMOL/L (ref 3.5–5.1)
PROT SERPL-MCNC: 7.3 G/DL (ref 6.4–8.2)
PSA SERPL-MCNC: 0.9 NG/ML (ref 0.01–4)
RBC # BLD AUTO: 4.87 M/UL (ref 4.1–5.7)
SODIUM SERPL-SCNC: 139 MMOL/L (ref 136–145)
SPECIMEN HOLD: NORMAL
TRIGL SERPL-MCNC: 91 MG/DL
TSH SERPL DL<=0.05 MIU/L-ACNC: 0.87 UIU/ML (ref 0.36–3.74)
VLDLC SERPL CALC-MCNC: 18.2 MG/DL
WBC # BLD AUTO: 7.4 K/UL (ref 4.1–11.1)

## 2025-02-18 RX ORDER — ATORVASTATIN CALCIUM 20 MG/1
20 TABLET, FILM COATED ORAL DAILY
Qty: 90 TABLET | Refills: 3 | Status: SHIPPED | OUTPATIENT
Start: 2025-02-18